# Patient Record
Sex: MALE | Race: BLACK OR AFRICAN AMERICAN | Employment: FULL TIME | URBAN - METROPOLITAN AREA
[De-identification: names, ages, dates, MRNs, and addresses within clinical notes are randomized per-mention and may not be internally consistent; named-entity substitution may affect disease eponyms.]

---

## 2017-04-03 ENCOUNTER — TELEPHONE (OUTPATIENT)
Dept: INTERNAL MEDICINE CLINIC | Age: 57
End: 2017-04-03

## 2017-04-03 ENCOUNTER — OFFICE VISIT (OUTPATIENT)
Dept: INTERNAL MEDICINE CLINIC | Age: 57
End: 2017-04-03

## 2017-04-03 VITALS
SYSTOLIC BLOOD PRESSURE: 139 MMHG | HEART RATE: 84 BPM | HEIGHT: 70 IN | DIASTOLIC BLOOD PRESSURE: 85 MMHG | RESPIRATION RATE: 18 BRPM | BODY MASS INDEX: 32.73 KG/M2 | WEIGHT: 228.6 LBS | TEMPERATURE: 98.2 F | OXYGEN SATURATION: 96 %

## 2017-04-03 DIAGNOSIS — G89.29 CHRONIC HEEL PAIN, RIGHT: ICD-10-CM

## 2017-04-03 DIAGNOSIS — R05.9 COUGH: Primary | ICD-10-CM

## 2017-04-03 DIAGNOSIS — M79.671 CHRONIC HEEL PAIN, RIGHT: ICD-10-CM

## 2017-04-03 RX ORDER — AZITHROMYCIN 250 MG/1
TABLET, FILM COATED ORAL
Qty: 6 TAB | Refills: 0 | Status: SHIPPED | OUTPATIENT
Start: 2017-04-03 | End: 2017-04-08

## 2017-04-03 NOTE — TELEPHONE ENCOUNTER
----- Message from Rivera Crawford MD sent at 4/3/2017  1:50 PM EDT -----  Please le him know his CXR was fine

## 2017-04-03 NOTE — MR AVS SNAPSHOT
Visit Information Date & Time Provider Department Dept. Phone Encounter #  
 4/3/2017  7:45 AM Johana Garcia [facility] [phone] [id number] Follow-up Instructions Return in about 3 months (around 7/3/2017), or if symptoms worsen or fail to improve. Upcoming Health Maintenance Date Due  
 EYE EXAM RETINAL OR DILATED Q1 2/7/1972 Pneumococcal 19-64 Highest Risk (1 of 3 - PCV13) 2/7/1981 DTaP/Tdap/Td series (1 - Tdap) 2/7/1983 MICROALBUMIN Q1 4/3/2017* FOBT Q 1 YEAR AGE 50-75 1/1/2018* HEMOGLOBIN A1C Q6M 6/16/2017 FOOT EXAM Q1 12/16/2017 LIPID PANEL Q1 12/16/2017 *Topic was postponed. The date shown is not the original due date. Allergies as of 4/3/2017  Review Complete On: 4/3/2017 By: Johana Garcia MD  
  
 Severity Noted Reaction Type Reactions Pcn [Penicillins]  03/20/2012    Rash Current Immunizations  Reviewed on 10/27/2014 Name Date Influenza Vaccine 10/7/2013  8:25 AM  
 Influenza Vaccine (Quad) PF 12/16/2016, 11/3/2015 Influenza Vaccine PF 10/27/2014  9:00 AM  
 Influenza Vaccine Split 11/9/2012 Not reviewed this visit You Were Diagnosed With   
  
 Codes Comments Cough    -  Primary ICD-10-CM: J07 ICD-9-CM: 988. 2 Chronic heel pain, right     ICD-10-CM: M79.671, G89.29 ICD-9-CM: 729.5, 338.29 Vitals BP Pulse Temp Resp Height(growth percentile) Weight(growth percentile) 139/85 84 98.2 °F (36.8 °C) (Oral) 18 5' 10\" (1.778 m) 228 lb 9.6 oz (103.7 kg) SpO2 BMI Smoking Status 96% 32.8 kg/m2 Current Some Day Smoker Vitals History BMI and BSA Data Body Mass Index Body Surface Area  
 32.8 kg/m 2 2.26 m 2 Preferred Pharmacy Pharmacy Name Phone [id number] [address], [address] [place] [phone] Your Updated Medication List  
  
   
This list is accurate as of: 4/3/17  8:11 AM.  Always use your most recent med list.  
  
  
  
  
 azithromycin 250 mg tablet Commonly known as:  Ann Marie Letters Take 2 tablets today, then take 1 tablet daily  
  
 losartan-hydroCHLOROthiazide 100-12.5 mg per tablet Commonly known as:  HYZAAR  
TAKE 1 TABLET BY MOUTH DAILY  
  
 ONE-A-DAY 50 PLUS PO Take  by mouth. Prescriptions Sent to Pharmacy Refills  
 azithromycin (ZITHROMAX) 250 mg tablet 0 Sig: Take 2 tablets today, then take 1 tablet daily Class: Normal  
 Pharmacy: [address] [facility] [place], [address] [place] [address], [address]  #: [phone] Follow-up Instructions Return in about 3 months (around 7/3/2017), or if symptoms worsen or fail to improve. To-Do List   
 Around 04/03/2017 Imaging:  XR CHEST PA LAT Patient Instructions Plantar Fasciitis: Care Instructions Your Care Instructions Plantar fasciitis is pain and inflammation of the plantar fascia, the tissue at the bottom of your foot that connects the heel bone to the toes. The plantar fascia also supports the arch. If you strain the plantar fascia, it can develop small tears and cause heel pain when you stand or walk. Plantar fasciitis can be caused by running or other sports. It also may occur in people who are overweight or who have high arches or flat feet. You may get plantar fasciitis if you walk or stand for long periods, or have a tight Achilles tendon or calf muscles. You can improve your foot pain with rest and other care at home. It might take a few weeks to a few months for your foot to heal completely. Follow-up care is a key part of your treatment and safety. Be sure to make and go to all appointments, and call your doctor if you are having problems. It's also a good idea to know your test results and keep a list of the medicines you take. How can you care for yourself at home? · Rest your feet often.  Reduce your activity to a level that lets you avoid pain. If possible, do not run or walk on hard surfaces. · Take pain medicines exactly as directed. ¨ If the doctor gave you a prescription medicine for pain, take it as prescribed. ¨ If you are not taking a prescription pain medicine, take an over-the-counter anti-inflammatory medicine for pain and swelling, such as ibuprofen (Advil, Motrin) or naproxen (Aleve). Read and follow all instructions on the label. · Use ice massage to help with pain and swelling. You can use an ice cube or an ice cup several times a day. To make an ice cup, fill a paper cup with water and freeze it. Cut off the top of the cup until a half-inch of ice shows. Hold onto the remaining paper to use the cup. Rub the ice in small circles over the area for 5 to 7 minutes. · Contrast baths, which alternate hot and cold water, can also help reduce swelling. But because heat alone may make pain and swelling worse, end a contrast bath with a soak in cold water. · Wear a night splint if your doctor suggests it. A night splint holds your foot with the toes pointed up and the foot and ankle at a 90-degree angle. This position gives the bottom of your foot a constant, gentle stretch. · Do simple exercises such as calf stretches and towel stretches 2 to 3 times each day, especially when you first get up in the morning. These can help the plantar fascia become more flexible. They also make the muscles that support your arch stronger. Hold these stretches for 15 to 30 seconds per stretch. Repeat 2 to 4 times. ¨ Stand about 1 foot from a wall. Place the palms of both hands against the wall at chest level. Lean forward against the wall, keeping one leg with the knee straight and heel on the ground while bending the knee of the other leg. ¨ Sit down on the floor or a mat with your feet stretched in front of you. Roll up a towel lengthwise, and loop it over the ball of your foot.  Holding the towel at both ends, gently pull the towel toward you to stretch your foot. · Wear shoes with good arch support. Athletic shoes or shoes with a well-cushioned sole are good choices. · Try heel cups or shoe inserts (orthotics) to help cushion your heel. You can buy these at many shoe stores. · Put on your shoes as soon as you get out of bed. Going barefoot or wearing slippers may make your pain worse. · Reach and stay at a good weight for your height. This puts less strain on your feet. When should you call for help? Call your doctor now or seek immediate medical care if: 
· You have heel pain with fever, redness, or warmth in your heel. · You cannot put weight on the sore foot. Watch closely for changes in your health, and be sure to contact your doctor if: 
· You have numbness or tingling in your heel. · Your heel pain lasts more than 2 weeks. Where can you learn more? Go to http://stephanieEcochlor.info/. Elisabeth Metz in the search box to learn more about \"Plantar Fasciitis: Care Instructions. \" Current as of: May 23, 2016 Content Version: 11.2 © 8146-6410 [employer]. Care instructions adapted under license by [employer] (which disclaims liability or warranty for this information). If you have questions about a medical condition or this instruction, always ask your healthcare professional. [address] any warranty or liability for your use of this information. Plantar Fasciitis: Exercises Your Care Instructions Here are some examples of typical rehabilitation exercises for your condition. Start each exercise slowly. Ease off the exercise if you start to have pain. Your doctor or physical therapist will tell you when you can start these exercises and which ones will work best for you. How to do the exercises Note: Each exercise should create a pulling feeling but should not cause pain. Towel stretch 1. Sit with your legs extended and knees straight. 2. Place a towel around your foot just under the toes. 3. Hold each end of the towel in each hand, with your hands above your knees. 4. Pull back with the towel so that your foot stretches toward you. 5. Hold the position for at least 15 to 30 seconds. 6. Repeat 2 to 4 times a session, up to 5 sessions a day. Calf stretch Note: This exercise stretches the muscles at the back of the lower leg (the calf) and the Achilles tendon. Do this exercise 3 or 4 times a day, 5 days a week. 1. Stand facing a wall with your hands on the wall at about eye level. Put the leg you want to stretch about a step behind your other leg. 2. Keeping your back heel on the floor, bend your front knee until you feel a stretch in the back leg. 3. Hold the stretch for 15 to 30 seconds. Repeat 2 to 4 times. Plantar fascia and calf stretch Note: Stretching the plantar fascia and calf muscles can increase flexibility and decrease heel pain. You can do this exercise several times each day and before and after activity. 1. Stand on a step as shown above. Be sure to hold on to the banister. 2. Slowly let your heels down over the edge of the step as you relax your calf muscles. You should feel a gentle stretch across the bottom of your foot and up the back of your leg to your knee. 3. Hold the stretch about 15 to 30 seconds, and then tighten your calf muscle a little to bring your heel back up to the level of the step. Repeat 2 to 4 times. Towel curls 1. While sitting, place your foot on a towel on the floor and scrunch the towel toward you with your toes. 2. Then, also using your toes, push the towel away from you. Note: Make this exercise more challenging by placing a weighted object, such as a soup can, on the other end of the towel. [place] pickups 1. Put marbles on the floor next to a cup. 
2. Using your toes, try to lift the marbles up from the floor and put them in the cup. Follow-up care is a key part of your treatment and safety. Be sure to make and go to all appointments, and call your doctor if you are having problems. It's also a good idea to know your test results and keep a list of the medicines you take. Where can you learn more? Go to http://stephanie-leilani.info/. Frederick Flanagan in the search box to learn more about \"Plantar Fasciitis: Exercises. \" Current as of: May 23, 2016 Content Version: 11.2 © 5954-6824 [employer]. Care instructions adapted under license by [employer] (which disclaims liability or warranty for this information). If you have questions about a medical condition or this instruction, always ask your healthcare professional. [address] any warranty or liability for your use of this information. Introducing [facility]! Dear Annie Merlos: Thank you for requesting a [employer] account. Our records indicate that you already have an active [employer] account. You can access your account anytime at [email or web address] Did you know that you can access your hospital and ER discharge instructions at any time in [employer]? You can also review all of your test results from your hospital stay or ER visit. Additional Information If you have questions, please visit the Frequently Asked Questions section of the [employer] website at [email or web address] Remember, [employer] is NOT to be used for urgent needs. For medical emergencies, dial 911. Now available from your iPhone and Android! Please provide this summary of care documentation to your next provider. Your primary care clinician is listed as Anthony Gonzales. If you have any questions after today's visit, please call [phone].

## 2017-04-03 NOTE — PATIENT INSTRUCTIONS
Plantar Fasciitis: Care Instructions  Your Care Instructions    Plantar fasciitis is pain and inflammation of the plantar fascia, the tissue at the bottom of your foot that connects the heel bone to the toes. The plantar fascia also supports the arch. If you strain the plantar fascia, it can develop small tears and cause heel pain when you stand or walk. Plantar fasciitis can be caused by running or other sports. It also may occur in people who are overweight or who have high arches or flat feet. You may get plantar fasciitis if you walk or stand for long periods, or have a tight Achilles tendon or calf muscles. You can improve your foot pain with rest and other care at home. It might take a few weeks to a few months for your foot to heal completely. Follow-up care is a key part of your treatment and safety. Be sure to make and go to all appointments, and call your doctor if you are having problems. It's also a good idea to know your test results and keep a list of the medicines you take. How can you care for yourself at home? · Rest your feet often. Reduce your activity to a level that lets you avoid pain. If possible, do not run or walk on hard surfaces. · Take pain medicines exactly as directed. ¨ If the doctor gave you a prescription medicine for pain, take it as prescribed. ¨ If you are not taking a prescription pain medicine, take an over-the-counter anti-inflammatory medicine for pain and swelling, such as ibuprofen (Advil, Motrin) or naproxen (Aleve). Read and follow all instructions on the label. · Use ice massage to help with pain and swelling. You can use an ice cube or an ice cup several times a day. To make an ice cup, fill a paper cup with water and freeze it. Cut off the top of the cup until a half-inch of ice shows. Hold onto the remaining paper to use the cup. Rub the ice in small circles over the area for 5 to 7 minutes.   · Contrast baths, which alternate hot and cold water, can also help reduce swelling. But because heat alone may make pain and swelling worse, end a contrast bath with a soak in cold water. · Wear a night splint if your doctor suggests it. A night splint holds your foot with the toes pointed up and the foot and ankle at a 90-degree angle. This position gives the bottom of your foot a constant, gentle stretch. · Do simple exercises such as calf stretches and towel stretches 2 to 3 times each day, especially when you first get up in the morning. These can help the plantar fascia become more flexible. They also make the muscles that support your arch stronger. Hold these stretches for 15 to 30 seconds per stretch. Repeat 2 to 4 times. ¨ Stand about 1 foot from a wall. Place the palms of both hands against the wall at chest level. Lean forward against the wall, keeping one leg with the knee straight and heel on the ground while bending the knee of the other leg. ¨ Sit down on the floor or a mat with your feet stretched in front of you. Roll up a towel lengthwise, and loop it over the ball of your foot. Holding the towel at both ends, gently pull the towel toward you to stretch your foot. · Wear shoes with good arch support. Athletic shoes or shoes with a well-cushioned sole are good choices. · Try heel cups or shoe inserts (orthotics) to help cushion your heel. You can buy these at many shoe stores. · Put on your shoes as soon as you get out of bed. Going barefoot or wearing slippers may make your pain worse. · Reach and stay at a good weight for your height. This puts less strain on your feet. When should you call for help? Call your doctor now or seek immediate medical care if:  · You have heel pain with fever, redness, or warmth in your heel. · You cannot put weight on the sore foot. Watch closely for changes in your health, and be sure to contact your doctor if:  · You have numbness or tingling in your heel. · Your heel pain lasts more than 2 weeks.   Where can you learn more? Go to http://stephanie-leilani.info/. Tano Mendoza in the search box to learn more about \"Plantar Fasciitis: Care Instructions. \"  Current as of: May 23, 2016  Content Version: 11.2  © 1937-3158 [employer]. Care instructions adapted under license by [employer] (which disclaims liability or warranty for this information). If you have questions about a medical condition or this instruction, always ask your healthcare professional. [address] any warranty or liability for your use of this information. Plantar Fasciitis: Exercises  Your Care Instructions  Here are some examples of typical rehabilitation exercises for your condition. Start each exercise slowly. Ease off the exercise if you start to have pain. Your doctor or physical therapist will tell you when you can start these exercises and which ones will work best for you. How to do the exercises  Note: Each exercise should create a pulling feeling but should not cause pain. Towel stretch    1. Sit with your legs extended and knees straight. 2. Place a towel around your foot just under the toes. 3. Hold each end of the towel in each hand, with your hands above your knees. 4. Pull back with the towel so that your foot stretches toward you. 5. Hold the position for at least 15 to 30 seconds. 6. Repeat 2 to 4 times a session, up to 5 sessions a day. Calf stretch    Note: This exercise stretches the muscles at the back of the lower leg (the calf) and the Achilles tendon. Do this exercise 3 or 4 times a day, 5 days a week. 1. Stand facing a wall with your hands on the wall at about eye level. Put the leg you want to stretch about a step behind your other leg. 2. Keeping your back heel on the floor, bend your front knee until you feel a stretch in the back leg. 3. Hold the stretch for 15 to 30 seconds. Repeat 2 to 4 times.   Plantar fascia and calf stretch    Note: Stretching the plantar fascia and calf muscles can increase flexibility and decrease heel pain. You can do this exercise several times each day and before and after activity. 1. Stand on a step as shown above. Be sure to hold on to the banister. 2. Slowly let your heels down over the edge of the step as you relax your calf muscles. You should feel a gentle stretch across the bottom of your foot and up the back of your leg to your knee. 3. Hold the stretch about 15 to 30 seconds, and then tighten your calf muscle a little to bring your heel back up to the level of the step. Repeat 2 to 4 times. Towel curls    1. While sitting, place your foot on a towel on the floor and scrunch the towel toward you with your toes. 2. Then, also using your toes, push the towel away from you. Note: Make this exercise more challenging by placing a weighted object, such as a soup can, on the other end of the towel. [place] pickups    1. Put marbles on the floor next to a cup.  2. Using your toes, try to lift the marbles up from the floor and put them in the cup. Follow-up care is a key part of your treatment and safety. Be sure to make and go to all appointments, and call your doctor if you are having problems. It's also a good idea to know your test results and keep a list of the medicines you take. Where can you learn more? Go to http://stephanie-leilani.info/. Fabiano Backers in the search box to learn more about \"Plantar Fasciitis: Exercises. \"  Current as of: May 23, 2016  Content Version: 11.2  © 6445-0601 [employer]. Care instructions adapted under license by [employer] (which disclaims liability or warranty for this information). If you have questions about a medical condition or this instruction, always ask your healthcare professional. [address] any warranty or liability for your use of this information.
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
## 2017-04-03 NOTE — TELEPHONE ENCOUNTER
Attempted to contact pt at  number, no answer. Lvm for pt that everything looked clear and normal. Advised to contact office at [phone] if sxs worsen or do not improve.      Be advised

## 2017-04-03 NOTE — PROGRESS NOTES
Chief Complaint   Patient presents with    Cough     Pt states that he caught a cold approx 5-6 weeks ago, and has not been able to kick the cough       Pt preferred language for health care discussion is english. Is someone accompanying this pt? no    Is the patient using any DME equipment during OV? no    Depression Screening completed. Yes    Learning Assessment completed. Yes    Abuse Screening completed. Yes    Health Maintenance reviewed and discussed per provider. Yes    Pt is due for Eye exam, Pneumo-13 or Peumo-23, Tdap. Please order/place referral if appropriate. Advance Directive:  1. Do you have an advance directive in place? Patient Reply:no    2. If not, would you like material regarding how to put one in place? Patient Reply: No    Coordination of Care:  1. Have you been to the ER, urgent care clinic since your last visit? Hospitalized since your last visit? no    2. Have you seen or consulted any other health care providers outside of the [address] [place] [address] since your last visit? Include any pap smears or colon screening.  no

## 2017-07-05 ENCOUNTER — OFFICE VISIT (OUTPATIENT)
Dept: INTERNAL MEDICINE CLINIC | Age: 57
End: 2017-07-05

## 2017-07-05 ENCOUNTER — HOSPITAL ENCOUNTER (OUTPATIENT)
Dept: LAB | Age: 57
Discharge: HOME | End: 2017-07-05
Payer: COMMERCIAL

## 2017-07-05 VITALS
HEART RATE: 69 BPM | DIASTOLIC BLOOD PRESSURE: 87 MMHG | HEIGHT: 70 IN | RESPIRATION RATE: 18 BRPM | OXYGEN SATURATION: 97 % | WEIGHT: 223 LBS | TEMPERATURE: 97.1 F | SYSTOLIC BLOOD PRESSURE: 121 MMHG | BODY MASS INDEX: 31.92 KG/M2

## 2017-07-05 DIAGNOSIS — Z23 NEED FOR TDAP VACCINATION: ICD-10-CM

## 2017-07-05 DIAGNOSIS — R73.03 PREDIABETES: ICD-10-CM

## 2017-07-05 DIAGNOSIS — E78.5 DYSLIPIDEMIA: ICD-10-CM

## 2017-07-05 DIAGNOSIS — I10 BENIGN HYPERTENSION WITHOUT CHF: ICD-10-CM

## 2017-07-05 DIAGNOSIS — I10 BENIGN HYPERTENSION WITHOUT CHF: Primary | ICD-10-CM

## 2017-07-05 LAB
ALBUMIN SERPL BCP-MCNC: 4.1 G/DL (ref 3.4–5)
ALBUMIN/GLOB SERPL: 1.5 {RATIO} (ref 0.8–1.7)
ALP SERPL-CCNC: 63 U/L (ref 45–117)
ALT SERPL-CCNC: 37 U/L (ref 16–61)
ANION GAP BLD CALC-SCNC: 9 MMOL/L (ref 3–18)
APPEARANCE UR: CLEAR
AST SERPL W P-5'-P-CCNC: 48 U/L (ref 15–37)
BILIRUB SERPL-MCNC: 0.7 MG/DL (ref 0.2–1)
BILIRUB UR QL: NEGATIVE
BUN SERPL-MCNC: 19 MG/DL (ref 7–18)
BUN/CREAT SERPL: 20 (ref 12–20)
CALCIUM SERPL-MCNC: 9.3 MG/DL (ref 8.5–10.1)
CHLORIDE SERPL-SCNC: 104 MMOL/L (ref 100–108)
CHOLEST SERPL-MCNC: 132 MG/DL
CO2 SERPL-SCNC: 26 MMOL/L (ref 21–32)
COLOR UR: YELLOW
CREAT SERPL-MCNC: 0.93 MG/DL (ref 0.6–1.3)
ERYTHROCYTE [DISTWIDTH] IN BLOOD BY AUTOMATED COUNT: 14.2 % (ref 11.6–14.5)
GLOBULIN SER CALC-MCNC: 2.7 G/DL (ref 2–4)
GLUCOSE SERPL-MCNC: 103 MG/DL (ref 74–99)
GLUCOSE UR STRIP.AUTO-MCNC: NEGATIVE MG/DL
HBA1C MFR BLD: 6 % (ref 4.2–5.6)
HCT VFR BLD AUTO: 44.8 % (ref 36–48)
HDLC SERPL-MCNC: 45 MG/DL (ref 40–60)
HDLC SERPL: 2.9 {RATIO} (ref 0–5)
HGB BLD-MCNC: 14.8 G/DL (ref 13–16)
HGB UR QL STRIP: NEGATIVE
KETONES UR QL STRIP.AUTO: NEGATIVE MG/DL
LDLC SERPL CALC-MCNC: 70 MG/DL (ref 0–100)
LEUKOCYTE ESTERASE UR QL STRIP.AUTO: NEGATIVE
LIPID PROFILE,FLP: NORMAL
MCH RBC QN AUTO: 28.8 PG (ref 24–34)
MCHC RBC AUTO-ENTMCNC: 33 G/DL (ref 31–37)
MCV RBC AUTO: 87.3 FL (ref 74–97)
NITRITE UR QL STRIP.AUTO: NEGATIVE
PH UR STRIP: 7 [PH] (ref 5–8)
PLATELET # BLD AUTO: 253 K/UL (ref 135–420)
PMV BLD AUTO: 12.1 FL (ref 9.2–11.8)
POTASSIUM SERPL-SCNC: 3.9 MMOL/L (ref 3.5–5.5)
PROT SERPL-MCNC: 6.8 G/DL (ref 6.4–8.2)
PROT UR STRIP-MCNC: NEGATIVE MG/DL
RBC # BLD AUTO: 5.13 M/UL (ref 4.7–5.5)
SODIUM SERPL-SCNC: 139 MMOL/L (ref 136–145)
SP GR UR REFRACTOMETRY: 1.01 (ref 1–1.03)
TRIGL SERPL-MCNC: 85 MG/DL (ref ?–150)
UROBILINOGEN UR QL STRIP.AUTO: 0.2 EU/DL (ref 0.2–1)
VLDLC SERPL CALC-MCNC: 17 MG/DL
WBC # BLD AUTO: 5.7 K/UL (ref 4.6–13.2)

## 2017-07-05 PROCEDURE — 80061 LIPID PANEL: CPT | Performed by: INTERNAL MEDICINE

## 2017-07-05 PROCEDURE — 36415 COLL VENOUS BLD VENIPUNCTURE: CPT | Performed by: INTERNAL MEDICINE

## 2017-07-05 PROCEDURE — 85027 COMPLETE CBC AUTOMATED: CPT | Performed by: INTERNAL MEDICINE

## 2017-07-05 PROCEDURE — 83036 HEMOGLOBIN GLYCOSYLATED A1C: CPT | Performed by: INTERNAL MEDICINE

## 2017-07-05 PROCEDURE — 80053 COMPREHEN METABOLIC PANEL: CPT | Performed by: INTERNAL MEDICINE

## 2017-07-05 PROCEDURE — 81003 URINALYSIS AUTO W/O SCOPE: CPT | Performed by: INTERNAL MEDICINE

## 2017-07-05 NOTE — PROGRESS NOTES
ROOM # 2    Jeovanny Willard  presents today for   Chief Complaint   Patient presents with    Hypertension     3 month f/u    Cholesterol Problem     3 month f/u       Teetee Shultz preferred language for health care discussion is english/other. Is someone accompanying this pt? no    Is the patient using any DME equipment during OV? no    Depression Screening:  PHQ over the last two weeks 7/5/2017 4/3/2017 12/16/2016 7/2/2015 6/25/2014 2/7/2014   Little interest or pleasure in doing things Not at all Not at all Not at all Not at all Not at all Not at all   Feeling down, depressed or hopeless Not at all Not at all Not at all Not at all Not at all Not at all   Total Score PHQ 2 0 0 0 0 0 0       Learning Assessment:  Learning Assessment 3/4/2016 6/25/2014   PRIMARY LEARNER Patient Patient   HIGHEST LEVEL OF EDUCATION - PRIMARY LEARNER  GRADUATED HIGH SCHOOL OR GED -   BARRIERS PRIMARY LEARNER NONE -   CO-LEARNER CAREGIVER No -   PRIMARY LANGUAGE ENGLISH ENGLISH    NEED No -   LEARNER PREFERENCE PRIMARY DEMONSTRATION LISTENING   LEARNING SPECIAL TOPICS no -   ANSWERED BY patient patient   RELATIONSHIP SELF SELF   ASSESSMENT COMMENT none -       Abuse Screening:  No flowsheet data found. Fall Risk  Fall Risk Assessment, last 12 mths 6/25/2014   Able to walk? Yes   Fall in past 12 months? No       Health Maintenance reviewed and discussed per provider. Yes    Teetee Shultz is due for eye exam, pneumonia vax, TDAP vax, microalbumin, A1C . Please order/place referral if appropriate. Advance Directive:  1. Do you have an advance directive in place? Patient Reply: no    2. If not, would you like material regarding how to put one in place? Patient Reply: no    Coordination of Care:  1. Have you been to the ER, urgent care clinic since your last visit? Hospitalized since your last visit? no    2.  Have you seen or consulted any other health care providers outside of the [facility] [address] since your last visit? Include any pap smears or colon screening.  oncology

## 2017-07-05 NOTE — MR AVS SNAPSHOT
Visit Information Date & Time Provider Department Dept. Phone Encounter #  
 7/5/2017  8:00 AM Davey Pathak MD [address] [phone] [id number] Follow-up Instructions Return in about 6 months (around 1/5/2018) for CPE/HTN/Pre-DM with Dr. Brian Gamble. Upcoming Health Maintenance Date Due  
 EYE EXAM RETINAL OR DILATED Q1 2/7/1972 FOBT Q 1 YEAR AGE 50-75 1/1/2018* INFLUENZA AGE 9 TO ADULT 8/1/2017 FOOT EXAM Q1 12/16/2017 LIPID PANEL Q1 12/16/2017 HEMOGLOBIN A1C Q6M 1/5/2018 Pneumococcal 19-64 Highest Risk (2 of 3 - PCV13) 7/5/2018 MICROALBUMIN Q1 7/5/2018 DTaP/Tdap/Td series (2 - Td) 7/5/2027 *Topic was postponed. The date shown is not the original due date. Allergies as of 7/5/2017  Review Complete On: 7/5/2017 By: Davey Pathak MD  
  
 Severity Noted Reaction Type Reactions Pcn [Penicillins]  03/20/2012    Rash Current Immunizations  Reviewed on 10/27/2014 Name Date Influenza Vaccine 10/7/2013  8:25 AM  
 Influenza Vaccine (Quad) PF 12/16/2016, 11/3/2015 Influenza Vaccine PF 10/27/2014  9:00 AM  
 Influenza Vaccine Split 11/9/2012 Tdap  Incomplete Not reviewed this visit You Were Diagnosed With   
  
 Codes Comments Benign hypertension without CHF    -  Primary ICD-10-CM: I10 
ICD-9-CM: 401.1 Prediabetes     ICD-10-CM: R73.03 
ICD-9-CM: 790.29 Dyslipidemia     ICD-10-CM: E78.5 ICD-9-CM: 272.4 Need for Tdap vaccination     ICD-10-CM: R03 ICD-9-CM: V06.1 Vitals BP Pulse Temp Resp Height(growth percentile) Weight(growth percentile) 121/87 (BP 1 Location: Left arm, BP Patient Position: Sitting) 69 97.1 °F (36.2 °C) (Oral) 18 5' 10\" (1.778 m) 223 lb (101.2 kg) SpO2 BMI Smoking Status 97% 32 kg/m2 Current Some Day Smoker Vitals History BMI and BSA Data Body Mass Index Body Surface Area 32 kg/m 2 2.24 m 2 Preferred Pharmacy Pharmacy Name Phone [facility][id number], [phone] Your Updated Medication List  
  
   
This list is accurate as of: 7/5/17  8:28 AM.  Always use your most recent med list.  
  
  
  
  
 losartan-hydroCHLOROthiazide 100-12.5 mg per tablet Commonly known as:  HYZAAR  
TAKE 1 TABLET BY MOUTH DAILY  
  
 ONE-A-DAY 50 PLUS PO Take  by mouth. We Performed the Following TETANUS, DIPHTHERIA TOXOIDS AND ACELLULAR PERTUSSIS VACCINE (TDAP), IN INDIVIDS. >=7, IM [id number] CPT(R)] Follow-up Instructions Return in about 6 months (around 1/5/2018) for CPE/HTN/Pre-DM with Dr. Brian Gamble. To-Do List   
 07/05/2017 Lab:  CBC W/O DIFF   
  
 07/05/2017 Lab:  HEMOGLOBIN A1C W/O EAG   
  
 07/05/2017 Lab:  LIPID PANEL   
  
 07/05/2017 Lab:  METABOLIC PANEL, COMPREHENSIVE   
  
 07/05/2017 Lab:  URINALYSIS W/ RFLX MICROSCOPIC Patient Instructions 1) follow-up in 6 months or sooner if worsening symptoms. 2) Dr. Irene To fax number: [phone] (p) [phone] Introducing hospitals & HEALTH SERVICES! Dear Skyla Infante: Thank you for requesting a [facility] account. Our records indicate that you already have an active [facility] account. You can access your account anytime at [email or web address] Did you know that you can access your hospital and ER discharge instructions at any time in [facility]? You can also review all of your test results from your hospital stay or ER visit. Additional Information If you have questions, please visit the Frequently Asked Questions section of the [facility] website at [email or web address] Remember, [facility] is NOT to be used for urgent needs. For medical emergencies, dial 911. Now available from your iPhone and Android! Please provide this summary of care documentation to your next provider. Your primary care clinician is listed as Anthony Gonzales. If you have any questions after today's visit, please call [phone].

## 2017-07-05 NOTE — PATIENT INSTRUCTIONS
1) follow-up in 6 months or sooner if worsening symptoms.        2) Dr. Chirag Barry fax number: [phone] (p) [phone]
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
## 2017-07-09 NOTE — PROGRESS NOTES
Please let pt know that labs were normal except:    1) HgA1c is 6.0. He is still preDM and not DM. Continue to work on diet and exercise. 2) LFT little up. Is he taking tylenol, herbals, IVDU, ETOH? If so needs to stop. Will recheck in 2 weeks. 3) glucose up at 103. Work on diet and exercise. 4) kidney function up a little. We will monitor. Keep hydrated with water. Probably up because fasting. 5) does he have f/u with his PCP in 3 months for HTN and predm?

## 2017-07-12 ENCOUNTER — TELEPHONE (OUTPATIENT)
Dept: INTERNAL MEDICINE CLINIC | Age: 57
End: 2017-07-12

## 2017-07-12 NOTE — TELEPHONE ENCOUNTER
----- Message from Ranjana Cox MD sent at 7/9/2017  4:57 PM EDT -----  Please let pt know that labs were normal except:    1) HgA1c is 6.0. He is still preDM and not DM. Continue to work on diet and exercise. 2) LFT little up. Is he taking tylenol, herbals, IVDU, ETOH? If so needs to stop. Will recheck in 2 weeks. 3) glucose up at 103. Work on diet and exercise. 4) kidney function up a little. We will monitor. Keep hydrated with water. Probably up because fasting. 5) does he have f/u with his PCP in 3 months for HTN and predm?

## 2017-07-13 NOTE — TELEPHONE ENCOUNTER
2 pt. Identifiers confirmed. Pt. Notified of below. Pt. States he has been taking Black seed oil, that he started on his own for BP control, and ibuprofen. Pt. Told to hold the black seed oil for now. He denies taking anything else mentioned below. He does have an aptmt to f/u c PCP. No other questions/cocnerns at this time.

## 2017-12-22 RX ORDER — LOSARTAN POTASSIUM AND HYDROCHLOROTHIAZIDE 12.5; 1 MG/1; MG/1
TABLET ORAL
Qty: 90 TAB | Refills: 5 | Status: SHIPPED | OUTPATIENT
Start: 2017-12-22 | End: 2017-12-22 | Stop reason: SDUPTHER

## 2017-12-22 RX ORDER — LOSARTAN POTASSIUM AND HYDROCHLOROTHIAZIDE 12.5; 1 MG/1; MG/1
TABLET ORAL
Qty: 90 TAB | Refills: 5 | Status: SHIPPED | OUTPATIENT
Start: 2017-12-22 | End: 2019-01-10

## 2017-12-22 NOTE — TELEPHONE ENCOUNTER
Requested Prescriptions     Pending Prescriptions Disp Refills    losartan-hydroCHLOROthiazide (HYZAAR) 100-12.5 mg per tablet 90 Tab 5     Sig: TAKE 1 TABLET BY MOUTH DAILY

## 2018-01-26 ENCOUNTER — OFFICE VISIT (OUTPATIENT)
Dept: INTERNAL MEDICINE CLINIC | Age: 58
End: 2018-01-26

## 2018-01-26 ENCOUNTER — HOSPITAL ENCOUNTER (OUTPATIENT)
Dept: LAB | Age: 58
Discharge: HOME | End: 2018-01-26
Payer: COMMERCIAL

## 2018-01-26 VITALS
RESPIRATION RATE: 16 BRPM | BODY MASS INDEX: 32.5 KG/M2 | SYSTOLIC BLOOD PRESSURE: 135 MMHG | DIASTOLIC BLOOD PRESSURE: 87 MMHG | HEART RATE: 91 BPM | OXYGEN SATURATION: 98 % | WEIGHT: 227 LBS | TEMPERATURE: 98 F | HEIGHT: 70 IN

## 2018-01-26 DIAGNOSIS — R05.9 COUGH: ICD-10-CM

## 2018-01-26 DIAGNOSIS — Z11.59 NEED FOR HEPATITIS B SCREENING TEST: ICD-10-CM

## 2018-01-26 DIAGNOSIS — I10 ESSENTIAL HYPERTENSION: ICD-10-CM

## 2018-01-26 DIAGNOSIS — E11.9 TYPE 2 DIABETES MELLITUS WITHOUT COMPLICATION, WITHOUT LONG-TERM CURRENT USE OF INSULIN (HCC): ICD-10-CM

## 2018-01-26 DIAGNOSIS — Z12.5 SCREENING FOR PROSTATE CANCER: ICD-10-CM

## 2018-01-26 DIAGNOSIS — I10 ESSENTIAL HYPERTENSION: Primary | ICD-10-CM

## 2018-01-26 LAB
ALBUMIN SERPL-MCNC: 4 G/DL (ref 3.4–5)
ALBUMIN/GLOB SERPL: 1.3 {RATIO} (ref 0.8–1.7)
ALP SERPL-CCNC: 64 U/L (ref 45–117)
ALT SERPL-CCNC: 22 U/L (ref 16–61)
ANION GAP SERPL CALC-SCNC: 6 MMOL/L (ref 3–18)
AST SERPL-CCNC: 25 U/L (ref 15–37)
BILIRUB SERPL-MCNC: 0.6 MG/DL (ref 0.2–1)
BUN SERPL-MCNC: 14 MG/DL (ref 7–18)
BUN/CREAT SERPL: 13 (ref 12–20)
CALCIUM SERPL-MCNC: 9.2 MG/DL (ref 8.5–10.1)
CHLORIDE SERPL-SCNC: 102 MMOL/L (ref 100–108)
CHOLEST SERPL-MCNC: 164 MG/DL
CO2 SERPL-SCNC: 29 MMOL/L (ref 21–32)
CREAT SERPL-MCNC: 1.09 MG/DL (ref 0.6–1.3)
EST. AVERAGE GLUCOSE BLD GHB EST-MCNC: 131 MG/DL
GLOBULIN SER CALC-MCNC: 3 G/DL (ref 2–4)
GLUCOSE SERPL-MCNC: 90 MG/DL (ref 74–99)
HBA1C MFR BLD: 6.2 % (ref 4.2–5.6)
HDLC SERPL-MCNC: 45 MG/DL (ref 40–60)
HDLC SERPL: 3.6 {RATIO} (ref 0–5)
LDLC SERPL CALC-MCNC: 93.8 MG/DL (ref 0–100)
LIPID PROFILE,FLP: NORMAL
POTASSIUM SERPL-SCNC: 4.3 MMOL/L (ref 3.5–5.5)
PROT SERPL-MCNC: 7 G/DL (ref 6.4–8.2)
PSA SERPL-MCNC: 0.4 NG/ML (ref 0–4)
SODIUM SERPL-SCNC: 137 MMOL/L (ref 136–145)
TRIGL SERPL-MCNC: 126 MG/DL (ref ?–150)
VLDLC SERPL CALC-MCNC: 25.2 MG/DL

## 2018-01-26 PROCEDURE — 86706 HEP B SURFACE ANTIBODY: CPT | Performed by: INTERNAL MEDICINE

## 2018-01-26 PROCEDURE — 86704 HEP B CORE ANTIBODY TOTAL: CPT | Performed by: INTERNAL MEDICINE

## 2018-01-26 PROCEDURE — 86707 HEPATITIS BE ANTIBODY: CPT | Performed by: INTERNAL MEDICINE

## 2018-01-26 PROCEDURE — 80053 COMPREHEN METABOLIC PANEL: CPT | Performed by: INTERNAL MEDICINE

## 2018-01-26 PROCEDURE — 87340 HEPATITIS B SURFACE AG IA: CPT | Performed by: INTERNAL MEDICINE

## 2018-01-26 PROCEDURE — 80061 LIPID PANEL: CPT | Performed by: INTERNAL MEDICINE

## 2018-01-26 PROCEDURE — 84153 ASSAY OF PSA TOTAL: CPT | Performed by: INTERNAL MEDICINE

## 2018-01-26 PROCEDURE — 83036 HEMOGLOBIN GLYCOSYLATED A1C: CPT | Performed by: INTERNAL MEDICINE

## 2018-01-26 RX ORDER — ENTECAVIR 0.5 MG/1
TABLET, FILM COATED ORAL
COMMUNITY
Start: 2018-01-12 | End: 2019-07-16

## 2018-01-26 RX ORDER — FLUTICASONE PROPIONATE 50 MCG
2 SPRAY, SUSPENSION (ML) NASAL DAILY
Qty: 1 BOTTLE | Refills: 3 | Status: SHIPPED | OUTPATIENT
Start: 2018-01-26 | End: 2020-01-15 | Stop reason: SDUPTHER
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
## 2018-01-26 NOTE — PROGRESS NOTES
ROOM [id number] [facility] [address] presents today for   Chief Complaint   Patient presents with    Hypertension    Hepatitis B     2nd opinion       [id number] [facility] preferred language for health care discussion is english/other. Is someone accompanying this pt? no    Is the patient using any DME equipment during OV? no    Depression Screening:  PHQ over the last two weeks 7/5/2017 4/3/2017 12/16/2016 7/2/2015 6/25/2014 2/7/2014   Little interest or pleasure in doing things Not at all Not at all Not at all Not at all Not at all Not at all   Feeling down, depressed or hopeless Not at all Not at all Not at all Not at all Not at all Not at all   Total Score PHQ 2 0 0 0 0 0 0       Learning Assessment:  Learning Assessment 3/4/2016 6/25/2014   PRIMARY LEARNER Patient Patient   HIGHEST LEVEL OF EDUCATION - PRIMARY LEARNER  GRADUATED HIGH SCHOOL OR GED -   BARRIERS PRIMARY LEARNER NONE -   CO-LEARNER CAREGIVER No -   PRIMARY LANGUAGE ENGLISH ENGLISH    NEED No -   LEARNER PREFERENCE PRIMARY DEMONSTRATION LISTENING   LEARNING SPECIAL TOPICS no -   ANSWERED BY patient patient   RELATIONSHIP SELF SELF   ASSESSMENT COMMENT none -     Health Maintenance reviewed and discussed per provider. Yes    [id number] [facility] is due for eye exam, fobt influenza (stated he got it doesn't know when), foot a1c. Please order/place referral if appropriate. Advance Directive:  1. Do you have an advance directive in place? Patient Reply: yes    2. Per patient no changes to their ACP contact [address] Coordination of Care:  1. Have you been to the ER, urgent care clinic since your last visit? Hospitalized since your last visit? no    2. Have you seen or consulted any other health care providers outside of the [address] [place] [address] since your last visit? Include any pap smears or colon screening.  Yes, oncology

## 2018-01-26 NOTE — MR AVS SNAPSHOT
[address] [place] [address] 
 
 
 [address] [facility] [address] [address] [phone] Patient: Cj Salguero MRN: [id number]:[id number] Visit Information Date & Time Provider Department Dept. Phone Encounter #  
 1/26/2018  9:15 [facility] [phone] [id number] Follow-up Instructions Return in about 6 months (around 7/26/2018), or if symptoms worsen or fail to improve. Upcoming Health Maintenance Date Due  
 EYE EXAM RETINAL OR DILATED Q1 2/7/1972 FOBT Q 1 YEAR AGE 50-75 2/7/2015 Influenza Age 5 to Adult 8/1/2017 FOOT EXAM Q1 12/16/2017 HEMOGLOBIN A1C Q6M 1/5/2018 Pneumococcal 19-64 Highest Risk (2 of 3 - PCV13) 7/5/2018 MICROALBUMIN Q1 7/5/2018 LIPID PANEL Q1 7/5/2018 DTaP/Tdap/Td series (2 - Td) 7/5/2027 Allergies as of 1/26/2018  Review Complete On: 1/26/2018 By: Bobbi Hernandez MD  
  
 Severity Noted Reaction Type Reactions Pcn [Penicillins]  03/20/2012    Rash Current Immunizations  Reviewed on 7/5/2017 Name Date Influenza Vaccine 10/7/2013  8:25 AM  
 Influenza Vaccine (Quad) PF 12/16/2016, 11/3/2015 Influenza Vaccine PF 10/27/2014  9:00 AM  
 Influenza Vaccine Split 11/9/2012 Tdap 7/5/2017 Not reviewed this visit You Were Diagnosed With   
  
 Codes Comments Essential hypertension    -  Primary ICD-10-CM: I10 
ICD-9-CM: 401.9 Need for hepatitis B screening test     ICD-10-CM: Z11.59 
ICD-9-CM: V73.89 Cough     ICD-10-CM: R05 ICD-9-CM: 905. 2 Type 2 diabetes mellitus without complication, without long-term current use of insulin (HCC)     ICD-10-CM: E11.9 ICD-9-CM: 250.00 Screening for prostate cancer     ICD-10-CM: Z12.5 ICD-9-CM: V76.44 Vitals BP Pulse Temp Resp Height(growth percentile) Weight(growth percentile)  135/87 (BP 1 Location: Right arm, BP Patient Position: Sitting) 91 98 °F (36.7 °C) (Oral) 16 5' 10\" (1.778 m) 227 lb (103 kg) SpO2 BMI Smoking Status 98% 32.57 kg/m2 Current Some Day Smoker Vitals History BMI and BSA Data Body Mass Index Body Surface Area 32.57 kg/m 2 2.26 m 2 Preferred Pharmacy Pharmacy Name Phone [id number] [place], [address] [place] [address] [phone] Your Updated Medication List  
  
   
This list is accurate as of: 18 10:01 AM.  Always use your most recent med list.  
  
  
  
  
 entecavir 0.5 mg tablet Commonly known as:  BARACLUDE  
  
 fluticasone 50 mcg/actuation nasal spray Commonly known as:  Yulissa Bakari 2 Sprays by Both Nostrils route daily. losartan-hydroCHLOROthiazide 100-12.5 mg per tablet Commonly known as:  HYZAAR  
TAKE 1 TABLET BY MOUTH DAILY  
  
 ONE-A-DAY 50 PLUS PO Take  by mouth. Prescriptions Sent to Pharmacy Refills  
 fluticasone (FLONASE) 50 mcg/actuation nasal spray 3 Si Sprays by Both Nostrils route daily. Class: Normal  
 Pharmacy: [address] [place] [address]  #: [phone] Route: Both Nostrils Follow-up Instructions Return in about 6 months (around 2018), or if symptoms worsen or fail to improve. To-Do List   
 2018 Lab:  HEMOGLOBIN A1C WITH EAG   
  
 2018 Lab:  HEP B SURFACE AB   
  
 2018 Lab:  HEP B SURFACE AG   
  
 2018 Lab:  HEPATITIS B CORE AB, TOTAL   
  
 2018 Lab:  HEPATITIS BE AB   
  
 2018 Lab:  LIPID PANEL   
  
 2018 Lab:  METABOLIC PANEL, COMPREHENSIVE   
  
 2018 Lab:  PSA SCREENING (SCREENING) Patient Instructions Low Sodium Diet (2,000 Milligram): Care Instructions Your Care Instructions Too much sodium causes your body to hold on to extra water. This can raise your blood pressure and force your heart and kidneys to work harder.  In very serious cases, this could cause you to be put in the hospital. It might even be life-threatening. By limiting sodium, you will feel better and lower your risk of serious problems. The most common source of sodium is salt. People get most of the salt in their diet from canned, prepared, and packaged foods. Fast food and restaurant meals also are very high in sodium. Your doctor will probably limit your sodium to less than 2,000 milligrams (mg) a day. This limit counts all the sodium in prepared and packaged foods and any salt you add to your food. Follow-up care is a key part of your treatment and safety. Be sure to make and go to all appointments, and call your doctor if you are having problems. It's also a good idea to know your test results and keep a list of the medicines you take. How can you care for yourself at home? Read food labels · Read labels on cans and food packages. The labels tell you how much sodium is in each serving. Make sure that you look at the serving size. If you eat more than the serving size, you have eaten more sodium. · Food labels also tell you the Percent Daily Value for sodium. Choose products with low Percent Daily Values for sodium. · Be aware that sodium can come in forms other than salt, including monosodium glutamate (MSG), sodium citrate, and sodium bicarbonate (baking soda). MSG is often added to Asian food. When you eat out, you can sometimes ask for food without MSG or added salt. Buy low-sodium foods · Buy foods that are labeled \"unsalted\" (no salt added), \"sodium-free\" (less than 5 mg of sodium per serving), or \"low-sodium\" (less than 140 mg of sodium per serving). Foods labeled \"reduced-sodium\" and \"light sodium\" may still have too much sodium. Be sure to read the label to see how much sodium you are getting. · Buy fresh vegetables, or frozen vegetables without added sauces. Buy low-sodium versions of canned vegetables, soups, and other canned goods. Prepare low-sodium meals · Cut back on the amount of salt you use in cooking. This will help you adjust to the taste. Do not add salt after cooking. One teaspoon of salt has about 2,300 mg of sodium. · Take the salt shaker off the table. · Flavor your food with garlic, lemon juice, onion, vinegar, herbs, and spices. Do not use soy sauce, lite soy sauce, steak sauce, onion salt, garlic salt, celery salt, mustard, or ketchup on your food. · Use low-sodium salad dressings, sauces, and ketchup. Or make your own salad dressings and sauces without adding salt. · Use less salt (or none) when recipes call for it. You can often use half the salt a recipe calls for without losing flavor. Other foods such as rice, pasta, and grains do not need added salt. · Rinse canned vegetables, and cook them in fresh water. This removes some-but not all-of the salt. · Avoid water that is naturally high in sodium or that has been treated with water softeners, which add sodium. Call your local water company to find out the sodium content of your water supply. If you buy bottled water, read the label and choose a sodium-free brand. Avoid high-sodium foods · Avoid eating: ¨ Smoked, cured, salted, and canned meat, fish, and poultry. ¨ Ham, meléndez, hot dogs, and luncheon meats. ¨ Regular, hard, and processed cheese and regular peanut butter. ¨ Crackers with salted tops, and other salted snack foods such as pretzels, chips, and salted popcorn. ¨ Frozen prepared meals, unless labeled low-sodium. ¨ Canned and dried soups, broths, and bouillon, unless labeled sodium-free or low-sodium. ¨ Canned vegetables, unless labeled sodium-free or low-sodium. ¨ Western Noelle fries, pizza, tacos, and other fast foods. ¨ Pickles, olives, ketchup, and other condiments, especially soy sauce, unless labeled sodium-free or low-sodium. Where can you learn more? Go to http://reginald.info/. Enter V198 in the search box to learn more about \"Low Sodium Diet (2,000 Milligram): Care Instructions. \" Current as of: May 12, 2017 Content Version: 11.4 © 9123-8252 [employer]. Care instructions adapted under license by [employer] (which disclaims liability or warranty for this information). If you have questions about a medical condition or this instruction, always ask your healthcare professional. [place] [address] any warranty or liability for your use of this information. Introducing \[facility]\"" & HEALTH SERVICES! Dear Lawrence Latif: Thank you for requesting a PulseSocks account. Our records indicate that you already have an active PulseSocks account. You can access your account anytime at [email or web address] Did you know that you can access your hospital and ER discharge instructions at any time in PulseSocks? You can also review all of your test results from your hospital stay or ER visit. Additional Information If you have questions, please visit the Frequently Asked Questions section of the PulseSocks website at [email or web address] Remember, PulseSocks is NOT to be used for urgent needs. For medical emergencies, dial 911. Now available from your iPhone and Android! Please provide this summary of care documentation to your next provider. Your primary care clinician is listed as Anthony Gonzales. If you have any questions after today's visit, please call [phone].

## 2018-01-26 NOTE — PATIENT INSTRUCTIONS

## 2018-01-27 LAB — HBV CORE AB SERPL QL IA: POSITIVE

## 2018-01-28 LAB
HBV SURFACE AG SER QL: <0.1 INDEX
HBV SURFACE AG SER QL: NEGATIVE

## 2018-01-29 LAB
HBV E AB SERPL QL IA: POSITIVE
HBV SURFACE AB SER QL IA: POSITIVE
HBV SURFACE AB SERPL IA-ACNC: 260.21 MIU/ML
HEP BS AB COMMENT,HBSAC: NORMAL

## 2018-02-01 ENCOUNTER — TELEPHONE (OUTPATIENT)
Dept: INTERNAL MEDICINE CLINIC | Age: 58
End: 2018-02-01

## 2018-02-02 NOTE — TELEPHONE ENCOUNTER
Called spoke with patient advised that Dr. Halle Urena did send a letter and per her letter his labs are stable but he is positive hep b and that he needs to continue to follow up with his oncologist.  Patient verbalized understanding.

## 2018-07-10 ENCOUNTER — HOSPITAL ENCOUNTER (OUTPATIENT)
Dept: LAB | Age: 58
Discharge: HOME | End: 2018-07-10
Payer: COMMERCIAL

## 2018-07-10 ENCOUNTER — OFFICE VISIT (OUTPATIENT)
Dept: INTERNAL MEDICINE CLINIC | Age: 58
End: 2018-07-10

## 2018-07-10 VITALS
HEART RATE: 81 BPM | RESPIRATION RATE: 18 BRPM | SYSTOLIC BLOOD PRESSURE: 124 MMHG | WEIGHT: 224.8 LBS | TEMPERATURE: 98.3 F | HEIGHT: 70 IN | BODY MASS INDEX: 32.18 KG/M2 | DIASTOLIC BLOOD PRESSURE: 80 MMHG | OXYGEN SATURATION: 96 %

## 2018-07-10 DIAGNOSIS — I10 ESSENTIAL HYPERTENSION: ICD-10-CM

## 2018-07-10 DIAGNOSIS — Z00.00 PHYSICAL EXAM: Primary | ICD-10-CM

## 2018-07-10 DIAGNOSIS — Z00.00 PHYSICAL EXAM: ICD-10-CM

## 2018-07-10 DIAGNOSIS — R73.01 IFG (IMPAIRED FASTING GLUCOSE): ICD-10-CM

## 2018-07-10 LAB
ALBUMIN SERPL-MCNC: 4.3 G/DL (ref 3.4–5)
ALBUMIN/GLOB SERPL: 1.4 {RATIO} (ref 0.8–1.7)
ALP SERPL-CCNC: 72 U/L (ref 45–117)
ALT SERPL-CCNC: 30 U/L (ref 16–61)
ANION GAP SERPL CALC-SCNC: 8 MMOL/L (ref 3–18)
AST SERPL-CCNC: 32 U/L (ref 15–37)
BILIRUB SERPL-MCNC: 0.7 MG/DL (ref 0.2–1)
BUN SERPL-MCNC: 13 MG/DL (ref 7–18)
BUN/CREAT SERPL: 12 (ref 12–20)
CALCIUM SERPL-MCNC: 9.6 MG/DL (ref 8.5–10.1)
CHLORIDE SERPL-SCNC: 103 MMOL/L (ref 100–108)
CO2 SERPL-SCNC: 29 MMOL/L (ref 21–32)
CREAT SERPL-MCNC: 1.06 MG/DL (ref 0.6–1.3)
EST. AVERAGE GLUCOSE BLD GHB EST-MCNC: 131 MG/DL
GLOBULIN SER CALC-MCNC: 3 G/DL (ref 2–4)
GLUCOSE SERPL-MCNC: 104 MG/DL (ref 74–99)
HBA1C MFR BLD: 6.2 % (ref 4.2–5.6)
POTASSIUM SERPL-SCNC: 4.1 MMOL/L (ref 3.5–5.5)
PROT SERPL-MCNC: 7.3 G/DL (ref 6.4–8.2)
SODIUM SERPL-SCNC: 140 MMOL/L (ref 136–145)

## 2018-07-10 PROCEDURE — 80053 COMPREHEN METABOLIC PANEL: CPT | Performed by: INTERNAL MEDICINE

## 2018-07-10 PROCEDURE — 82043 UR ALBUMIN QUANTITATIVE: CPT | Performed by: INTERNAL MEDICINE

## 2018-07-10 PROCEDURE — 36415 COLL VENOUS BLD VENIPUNCTURE: CPT | Performed by: INTERNAL MEDICINE

## 2018-07-10 PROCEDURE — 83036 HEMOGLOBIN GLYCOSYLATED A1C: CPT | Performed by: INTERNAL MEDICINE

## 2018-07-10 NOTE — MR AVS SNAPSHOT
[address] [place] [address] 
 
 
 [address] [facility] 83 [address] 
[phone] Patient: Lala Chen MRN: [id number] Visit Information Date & Time Provider Department Dept. Phone Encounter #  
 7/10/2018 10:00 AM [facility] [phone] Follow-up Instructions Return in about 6 months (around 1/10/2019), or if symptoms worsen or fail to improve, for hypertension. Upcoming Health Maintenance Date Due  
 EYE EXAM RETINAL OR DILATED Q1 2/7/1972 FOBT Q 1 YEAR AGE 50-75 2/7/2015 FOOT EXAM Q1 12/16/2017 Pneumococcal 19-64 Highest Risk (2 of 3 - PCV13) 7/5/2018 MICROALBUMIN Q1 7/5/2018 HEMOGLOBIN A1C Q6M 7/26/2018 Influenza Age 5 to Adult 8/1/2018 LIPID PANEL Q1 1/26/2019 DTaP/Tdap/Td series (2 - Td) 7/5/2027 Allergies as of 7/10/2018  Review Complete On: 7/10/2018 By: Judy Butt Severity Noted Reaction Type Reactions Pcn [Penicillins]  03/20/2012    Rash Current Immunizations  Reviewed on 7/6/2018 Name Date Influenza Vaccine 11/14/2017, 10/7/2013  8:25 AM  
 Influenza Vaccine (Quad) PF 12/16/2016, 11/3/2015 Influenza Vaccine PF 10/27/2014  9:00 AM  
 Influenza Vaccine Split 11/9/2012 Tdap 7/5/2017 Not reviewed this visit You Were Diagnosed With   
  
 Codes Comments Physical exam    -  Primary ICD-10-CM: Z00.00 ICD-9-CM: V70.9 Essential hypertension     ICD-10-CM: I10 
ICD-9-CM: 401.9 IFG (impaired fasting glucose)     ICD-10-CM: R73.01 
ICD-9-CM: 790.21 Vitals BP Pulse Temp Resp Height(growth percentile) Weight(growth percentile) 124/80 (BP 1 Location: Right arm, BP Patient Position: Sitting) 81 98.3 °F (36.8 °C) (Oral) 18 5' 10\" (1.778 m) 224 lb 12.8 oz (102 kg) SpO2 BMI Smoking Status 96% 32.26 kg/m2 Current Some Day Smoker BMI and BSA Data Body Mass Index Body Surface Area  
 32.26 kg/m 2 2.24 m 2 Preferred Pharmacy Pharmacy Name Phone [id number] [address], [place] [address] ,[address] [phone] Your Updated Medication List  
  
   
This list is accurate as of 7/10/18 10:46 AM.  Always use your most recent med list.  
  
  
  
  
 entecavir 0.5 mg tablet Commonly known as:  BARACLUDE  
  
 fluticasone 50 mcg/actuation nasal spray Commonly known as:  Shelvia Birks 2 Sprays by Both Nostrils route daily. losartan-hydroCHLOROthiazide 100-12.5 mg per tablet Commonly known as:  HYZAAR  
TAKE 1 TABLET BY MOUTH DAILY  
  
 ONE-A-DAY 50 PLUS PO Take  by mouth. Follow-up Instructions Return in about 6 months (around 1/10/2019), or if symptoms worsen or fail to improve, for hypertension. To-Do List   
 07/10/2018 Lab:  HEMOGLOBIN A1C WITH EAG   
  
 07/10/2018 Lab:  METABOLIC PANEL, COMPREHENSIVE   
  
 07/10/2018 Lab:  MICROALBUMIN, UR, RAND W/ MICROALB/CREAT RATIO Patient Instructions DASH Diet: Care Instructions Your Care Instructions The DASH diet is an eating plan that can help lower your blood pressure. DASH stands for Dietary Approaches to Stop Hypertension. Hypertension is high blood pressure. The DASH diet focuses on eating foods that are high in calcium, potassium, and magnesium. These nutrients can lower blood pressure. The foods that are highest in these nutrients are fruits, vegetables, low-fat dairy products, nuts, seeds, and legumes. But taking calcium, potassium, and magnesium supplements instead of eating foods that are high in those nutrients does not have the same effect. The DASH diet also includes whole grains, fish, and poultry. The DASH diet is one of several lifestyle changes your doctor may recommend to lower your high blood pressure. Your doctor may also want you to decrease the amount of sodium in your diet.  Lowering sodium while following the DASH diet can lower blood pressure even further than just the DASH diet alone. Follow-up care is a key part of your treatment and safety. Be sure to make and go to all appointments, and call your doctor if you are having problems. It's also a good idea to know your test results and keep a list of the medicines you take. How can you care for yourself at home? Following the DASH diet · Eat 4 to 5 servings of fruit each day. A serving is 1 medium-sized piece of fruit, ½ cup chopped or canned fruit, 1/4 cup dried fruit, or 4 ounces (½ cup) of fruit juice. Choose fruit more often than fruit juice. · Eat 4 to 5 servings of vegetables each day. A serving is 1 cup of lettuce or raw leafy vegetables, ½ cup of chopped or cooked vegetables, or 4 ounces (½ cup) of vegetable juice. Choose vegetables more often than vegetable juice. · Get 2 to 3 servings of low-fat and fat-free dairy each day. A serving is 8 ounces of milk, 1 cup of yogurt, or 1 ½ ounces of cheese. · Eat 6 to 8 servings of grains each day. A serving is 1 slice of bread, 1 ounce of dry cereal, or ½ cup of cooked rice, pasta, or cooked cereal. Try to choose whole-grain products as much as possible. · Limit lean meat, poultry, and fish to 2 servings each day. A serving is 3 ounces, about the size of a deck of cards. · Eat 4 to 5 servings of nuts, seeds, and legumes (cooked dried beans, lentils, and split peas) each week. A serving is 1/3 cup of nuts, 2 tablespoons of seeds, or ½ cup of cooked beans or peas. · Limit fats and oils to 2 to 3 servings each day. A serving is 1 teaspoon of vegetable oil or 2 tablespoons of salad dressing. · Limit sweets and added sugars to 5 servings or less a week. A serving is 1 tablespoon jelly or jam, ½ cup sorbet, or 1 cup of lemonade. · Eat less than 2,300 milligrams (mg) of sodium a day. If you limit your sodium to 1,500 mg a day, you can lower your blood pressure even more. Tips for success · Start small. Do not try to make dramatic changes to your diet all at once. You might feel that you are missing out on your favorite foods and then be more likely to not follow the plan. Make small changes, and stick with them. Once those changes become habit, add a few more changes. · Try some of the following: ¨ Make it a goal to eat a fruit or vegetable at every meal and at snacks. This will make it easy to get the recommended amount of fruits and vegetables each day. ¨ Try yogurt topped with fruit and nuts for a snack or healthy dessert. ¨ Add lettuce, tomato, cucumber, and onion to sandwiches. ¨ Combine a ready-made pizza crust with low-fat mozzarella cheese and lots of vegetable toppings. Try using tomatoes, squash, spinach, broccoli, carrots, cauliflower, and onions. ¨ Have a variety of cut-up vegetables with a low-fat dip as an appetizer instead of chips and dip. ¨ Sprinkle sunflower seeds or chopped almonds over salads. Or try adding chopped walnuts or almonds to cooked vegetables. ¨ Try some vegetarian meals using beans and peas. Add garbanzo or kidney beans to salads. Make burritos and tacos with mashed strauss beans or black beans. Where can you learn more? Go to http://stephanie-leilani.info/. Enter E988 in the search box to learn more about \"DASH Diet: Care Instructions. \" Current as of: September 21, 2016 Content Version: 11.4 © 0807-5328 [employer]. Care instructions adapted under license by [employer] (which disclaims liability or warranty for this information). If you have questions about a medical condition or this instruction, always ask your healthcare professional. [place] [address] any warranty or liability for your use of this information. Introducing [facility] & HEALTH SERVICES! Dear Jigna Sánchez: Thank you for requesting a Intematix account.   Our records indicate that you already have an active DocLanding account. You can access your account anytime at [email or web address] Did you know that you can access your hospital and ER discharge instructions at any time in DocLanding? You can also review all of your test results from your hospital stay or ER visit. Additional Information If you have questions, please visit the Frequently Asked Questions section of the DocLanding website at [email or web address] Remember, DocLanding is NOT to be used for urgent needs. For medical emergencies, dial 911. Now available from your iPhone and Android! Please provide this summary of care documentation to your next provider. Your primary care clinician is listed as Anthony Gonzalse. If you have any questions after today's visit, please call [phone].
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
## 2018-07-10 NOTE — PROGRESS NOTES
ROOM # 16  Identified pt with two pt identifiers(name and ). Reviewed record in preparation for visit and have obtained necessary documentation. Chief Complaint   Patient presents with    Complete Physical      [address] [place] [address] preferred language for health care discussion is english/other. Is the patient using any DME equipment during OV? NO    Bora Alvarado is due for:  Health Maintenance Due   Topic    EYE EXAM RETINAL OR DILATED Q1     FOBT Q 1 YEAR AGE 50-75     FOOT EXAM Q1     Pneumococcal 19-64 Highest Risk (2 of 3 - PCV13)    MICROALBUMIN Q1     HEMOGLOBIN A1C Q6M      Health Maintenance reviewed and discussed per provider  Please order/place referral if appropriate. Advance Directive:  1. Do you have an advance directive in place? Patient Reply: Ama Crouch    2. If not, would you like material regarding how to put one in place? NO    Coordination of Care:  1. Have you been to the ER, urgent care clinic since your last visit? Hospitalized since your last visit? NO    2. Have you seen or consulted any other health care providers outside of the [address] [place] [address] since your last visit? Include any pap smears or colon screening. YES    Patient is accompanied by spouse I have received verbal consent from [address] [place] [address] to discuss any/all medical information while they are present in the room.     Learning Assessment:  Learning Assessment 3/4/2016 2014   PRIMARY LEARNER Patient Patient   HIGHEST LEVEL OF EDUCATION - PRIMARY LEARNER  GRADUATED HIGH SCHOOL OR GED -   BARRIERS PRIMARY LEARNER NONE -   CO-LEARNER CAREGIVER No -   PRIMARY LANGUAGE ENGLISH ENGLISH    NEED No -   LEARNER PREFERENCE PRIMARY DEMONSTRATION LISTENING   LEARNING SPECIAL TOPICS no -   ANSWERED BY patient patient   RELATIONSHIP SELF SELF   ASSESSMENT COMMENT none -     Depression Screening:  PHQ over the last two weeks 7/10/2018 2017 4/3/2017 2016 2015 2014 2/7/2014   Little interest or pleasure in doing things Not at all Not at all Not at all Not at all Not at all Not at all Not at all   Feeling down, depressed or hopeless Not at all Not at all Not at all Not at all Not at all Not at all Not at all   Total Score PHQ 2 0 0 0 0 0 0 0     Abuse Screening:  No flowsheet data found. Fall Risk  Fall Risk Assessment, last 12 mths 6/25/2014   Able to walk? Yes   Fall in past 12 months?  No

## 2018-07-10 NOTE — PATIENT INSTRUCTIONS

## 2018-07-11 LAB
CREAT UR-MCNC: 20.36 MG/DL (ref 30–125)
MICROALBUMIN UR-MCNC: <0.13 MG/DL (ref 0–3)
MICROALBUMIN/CREAT UR-RTO: ABNORMAL MG/G (ref 0–30)

## 2018-10-31 ENCOUNTER — DOCUMENTATION ONLY (OUTPATIENT)
Dept: INTERNAL MEDICINE CLINIC | Age: 58
End: 2018-10-31

## 2018-10-31 ENCOUNTER — CLINICAL SUPPORT (OUTPATIENT)
Dept: INTERNAL MEDICINE CLINIC | Age: 58
End: 2018-10-31

## 2018-10-31 VITALS — TEMPERATURE: 96.7 F

## 2018-10-31 DIAGNOSIS — Z23 ENCOUNTER FOR IMMUNIZATION: Primary | ICD-10-CM

## 2018-10-31 NOTE — PROGRESS NOTES
flu vaccine first dose administered in rt deltoid with patient's consent. Patient observed for 10 minutes, no reaction noted at present time. Patient tolerated procedure well and left without any complaints. Patient received flu VIS sheet and verbalized understanding.

## 2019-01-10 ENCOUNTER — OFFICE VISIT (OUTPATIENT)
Dept: INTERNAL MEDICINE CLINIC | Age: 59
End: 2019-01-10

## 2019-01-10 VITALS
SYSTOLIC BLOOD PRESSURE: 149 MMHG | TEMPERATURE: 95.7 F | DIASTOLIC BLOOD PRESSURE: 75 MMHG | BODY MASS INDEX: 33.7 KG/M2 | OXYGEN SATURATION: 99 % | RESPIRATION RATE: 18 BRPM | HEART RATE: 74 BPM | HEIGHT: 70 IN | WEIGHT: 235.4 LBS

## 2019-01-10 DIAGNOSIS — I10 ESSENTIAL HYPERTENSION: Primary | ICD-10-CM

## 2019-01-10 DIAGNOSIS — L98.9 SKIN LESION OF LEFT ARM: ICD-10-CM

## 2019-01-10 DIAGNOSIS — R73.01 IFG (IMPAIRED FASTING GLUCOSE): ICD-10-CM

## 2019-01-10 RX ORDER — LOSARTAN POTASSIUM AND HYDROCHLOROTHIAZIDE 12.5; 1 MG/1; MG/1
0.5 TABLET ORAL DAILY
Qty: 45 TAB | Refills: 3
Start: 2019-01-10 | End: 2019-03-13 | Stop reason: SDUPTHER

## 2019-01-10 NOTE — PROGRESS NOTES
ROOM # 16 Identified pt with two pt identifiers(name and ). Reviewed record in preparation for visit and have obtained necessary documentation. Chief Complaint Patient presents with  Hypertension Ole Combs preferred language for health care discussion is english/other. Is the patient using any DME equipment during OV? NO Ole Combs is due for: 
Health Maintenance Due Topic  
 EYE EXAM RETINAL OR DILATED  Shingrix Vaccine Age 50> (1 of 2)  FOBT Q 1 YEAR AGE 50-75   
 FOOT EXAM Q1   
 Pneumococcal 19-64 Highest Risk (2 of 3 - PCV13)  HEMOGLOBIN A1C Q6M   
 LIPID PANEL Q1 Health Maintenance reviewed and discussed per provider Please order/place referral if appropriate. Advance Directive: 1. Do you have an advance directive in place? Patient Reply: Feroz Lubin 
 
2. If not, would you like material regarding how to put one in place? NO Coordination of Care: 1. Have you been to the ER, urgent care clinic since your last visit? Hospitalized since your last visit? NO 
 
2. Have you seen or consulted any other health care providers outside of the [address] [place] [address] since your last visit? Include any pap smears or colon screening. NO Patient is accompanied by self I have received verbal consent from Ole Combs to discuss any/all medical information while they are present in the room. Learning Assessment: 
Learning Assessment 3/4/2016 2014 PRIMARY LEARNER Patient Patient HIGHEST LEVEL OF EDUCATION - PRIMARY LEARNER  GRADUATED HIGH SCHOOL OR GED -  
BARRIERS PRIMARY LEARNER NONE -  
CO-LEARNER CAREGIVER No - PRIMARY LANGUAGE ENGLISH ENGLISH  NEED No -  
LEARNER PREFERENCE PRIMARY DEMONSTRATION LISTENING  
LEARNING SPECIAL TOPICS no -  
ANSWERED BY patient patient RELATIONSHIP SELF SELF  
ASSESSMENT COMMENT none -  
 
Depression Screening: PHQ over the last two weeks 1/10/2019 7/10/2018 2017 4/3/2017 12/16/2016 7/2/2015 6/25/2014 Little interest or pleasure in doing things Not at all Not at all Not at all Not at all Not at all Not at all Not at all Feeling down, depressed, irritable, or hopeless Not at all Not at all Not at all Not at all Not at all Not at all Not at all Total Score PHQ 2 0 0 0 0 0 0 0 Abuse Screening: No flowsheet data found. Fall Risk Fall Risk Assessment, last 12 mths 6/25/2014 Able to walk? Yes Fall in past 12 months?  No

## 2019-01-10 NOTE — PATIENT INSTRUCTIONS
Low Sodium Diet (2,000 Milligram): Care Instructions Your Care Instructions Too much sodium causes your body to hold on to extra water. This can raise your blood pressure and force your heart and kidneys to work harder. In very serious cases, this could cause you to be put in the hospital. It might even be life-threatening. By limiting sodium, you will feel better and lower your risk of serious problems. The most common source of sodium is salt. People get most of the salt in their diet from canned, prepared, and packaged foods. Fast food and restaurant meals also are very high in sodium. Your doctor will probably limit your sodium to less than 2,000 milligrams (mg) a day. This limit counts all the sodium in prepared and packaged foods and any salt you add to your food. Follow-up care is a key part of your treatment and safety. Be sure to make and go to all appointments, and call your doctor if you are having problems. It's also a good idea to know your test results and keep a list of the medicines you take. How can you care for yourself at home? Read food labels · Read labels on cans and food packages. The labels tell you how much sodium is in each serving. Make sure that you look at the serving size. If you eat more than the serving size, you have eaten more sodium. · Food labels also tell you the Percent Daily Value for sodium. Choose products with low Percent Daily Values for sodium. · Be aware that sodium can come in forms other than salt, including monosodium glutamate (MSG), sodium citrate, and sodium bicarbonate (baking soda). MSG is often added to Asian food. When you eat out, you can sometimes ask for food without MSG or added salt. Buy low-sodium foods · Buy foods that are labeled \"unsalted\" (no salt added), \"sodium-free\" (less than 5 mg of sodium per serving), or \"low-sodium\" (less than 140 mg of sodium per serving).  Foods labeled \"reduced-sodium\" and \"light sodium\" may still have too much sodium. Be sure to read the label to see how much sodium you are getting. · Buy fresh vegetables, or frozen vegetables without added sauces. Buy low-sodium versions of canned vegetables, soups, and other canned goods. Prepare low-sodium meals · Cut back on the amount of salt you use in cooking. This will help you adjust to the taste. Do not add salt after cooking. One teaspoon of salt has about 2,300 mg of sodium. · Take the salt shaker off the table. · Flavor your food with garlic, lemon juice, onion, vinegar, herbs, and spices. Do not use soy sauce, lite soy sauce, steak sauce, onion salt, garlic salt, celery salt, mustard, or ketchup on your food. · Use low-sodium salad dressings, sauces, and ketchup. Or make your own salad dressings and sauces without adding salt. · Use less salt (or none) when recipes call for it. You can often use half the salt a recipe calls for without losing flavor. Other foods such as rice, pasta, and grains do not need added salt. · Rinse canned vegetables, and cook them in fresh water. This removes somebut not allof the salt. · Avoid water that is naturally high in sodium or that has been treated with water softeners, which add sodium. Call your local water company to find out the sodium content of your water supply. If you buy bottled water, read the label and choose a sodium-free brand. Avoid high-sodium foods · Avoid eating: 
? Smoked, cured, salted, and canned meat, fish, and poultry. ? Ham, meléndez, hot dogs, and luncheon meats. ? Regular, hard, and processed cheese and regular peanut butter. ? Crackers with salted tops, and other salted snack foods such as pretzels, chips, and salted popcorn. ? Frozen prepared meals, unless labeled low-sodium. ? Canned and dried soups, broths, and bouillon, unless labeled sodium-free or low-sodium. ? Canned vegetables, unless labeled sodium-free or low-sodium. ? Western Noelle fries, pizza, tacos, and other fast foods. ? Pickles, olives, ketchup, and other condiments, especially soy sauce, unless labeled sodium-free or low-sodium. Where can you learn more? Go to http://stephanie-leilani.info/. Enter M153 in the search box to learn more about \"Low Sodium Diet (2,000 Milligram): Care Instructions. \" Current as of: March 29, 2018 Content Version: 11.8 © 4112-9247 [employer]. Care instructions adapted under license by [employer] (which disclaims liability or warranty for this information). If you have questions about a medical condition or this instruction, always ask your healthcare professional. [address] any warranty or liability for your use of this information.
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
## 2019-03-11 RX ORDER — LOSARTAN POTASSIUM AND HYDROCHLOROTHIAZIDE 12.5; 1 MG/1; MG/1
TABLET ORAL
Qty: 90 TAB | Refills: 4 | Status: SHIPPED | OUTPATIENT
Start: 2019-03-11 | End: 2019-03-13 | Stop reason: SDUPTHER

## 2019-03-13 RX ORDER — LOSARTAN POTASSIUM AND HYDROCHLOROTHIAZIDE 12.5; 1 MG/1; MG/1
0.5 TABLET ORAL DAILY
Qty: 45 TAB | Refills: 3 | Status: SHIPPED | OUTPATIENT
Start: 2019-03-13 | End: 2020-01-01

## 2019-03-13 NOTE — TELEPHONE ENCOUNTER
Requested Prescriptions     Pending Prescriptions Disp Refills    losartan-hydroCHLOROthiazide (HYZAAR) 100-12.5 mg per tablet 45 Tab 3     Sig: Take 0.5 Tabs by mouth daily.

## 2019-07-16 ENCOUNTER — OFFICE VISIT (OUTPATIENT)
Dept: INTERNAL MEDICINE CLINIC | Age: 59
End: 2019-07-16

## 2019-07-16 VITALS
RESPIRATION RATE: 18 BRPM | HEART RATE: 75 BPM | TEMPERATURE: 96.8 F | SYSTOLIC BLOOD PRESSURE: 128 MMHG | BODY MASS INDEX: 31.78 KG/M2 | DIASTOLIC BLOOD PRESSURE: 81 MMHG | OXYGEN SATURATION: 100 % | WEIGHT: 222 LBS | HEIGHT: 70 IN

## 2019-07-16 DIAGNOSIS — I10 ESSENTIAL HYPERTENSION: Primary | ICD-10-CM

## 2019-07-16 DIAGNOSIS — C85.90 LYMPHOMA, UNSPECIFIED BODY REGION, UNSPECIFIED LYMPHOMA TYPE (HCC): ICD-10-CM

## 2019-07-16 DIAGNOSIS — R73.01 IFG (IMPAIRED FASTING GLUCOSE): ICD-10-CM

## 2019-07-16 LAB — HBA1C MFR BLD HPLC: 5.9 %
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
## 2019-07-16 NOTE — PATIENT INSTRUCTIONS

## 2020-01-01 RX ORDER — LOSARTAN POTASSIUM AND HYDROCHLOROTHIAZIDE 12.5; 1 MG/1; MG/1
TABLET ORAL
Qty: 45 TAB | Refills: 3 | Status: SHIPPED | OUTPATIENT
Start: 2020-01-01

## 2020-01-15 ENCOUNTER — HOSPITAL ENCOUNTER (OUTPATIENT)
Dept: LAB | Age: 60
Discharge: HOME | End: 2020-01-15
Payer: COMMERCIAL

## 2020-01-15 ENCOUNTER — OFFICE VISIT (OUTPATIENT)
Dept: INTERNAL MEDICINE CLINIC | Age: 60
End: 2020-01-15

## 2020-01-15 VITALS
TEMPERATURE: 98.6 F | HEART RATE: 85 BPM | OXYGEN SATURATION: 97 % | WEIGHT: 223 LBS | BODY MASS INDEX: 31.92 KG/M2 | SYSTOLIC BLOOD PRESSURE: 128 MMHG | HEIGHT: 70 IN | DIASTOLIC BLOOD PRESSURE: 83 MMHG | RESPIRATION RATE: 14 BRPM

## 2020-01-15 DIAGNOSIS — Z23 ENCOUNTER FOR IMMUNIZATION: ICD-10-CM

## 2020-01-15 DIAGNOSIS — I10 ESSENTIAL HYPERTENSION: Primary | ICD-10-CM

## 2020-01-15 DIAGNOSIS — Z12.5 SPECIAL SCREENING FOR MALIGNANT NEOPLASM OF PROSTATE: ICD-10-CM

## 2020-01-15 DIAGNOSIS — R73.03 PREDIABETES: ICD-10-CM

## 2020-01-15 DIAGNOSIS — I10 ESSENTIAL HYPERTENSION: ICD-10-CM

## 2020-01-15 DIAGNOSIS — E78.5 HYPERLIPIDEMIA, UNSPECIFIED HYPERLIPIDEMIA TYPE: ICD-10-CM

## 2020-01-15 LAB
ALBUMIN SERPL-MCNC: 3.9 G/DL (ref 3.4–5)
ALBUMIN/GLOB SERPL: 1.3 {RATIO} (ref 0.8–1.7)
ALP SERPL-CCNC: 77 U/L (ref 45–117)
ALT SERPL-CCNC: 22 U/L (ref 16–61)
ANION GAP SERPL CALC-SCNC: 7 MMOL/L (ref 3–18)
APPEARANCE UR: CLEAR
AST SERPL-CCNC: 22 U/L (ref 10–38)
BILIRUB SERPL-MCNC: 1.1 MG/DL (ref 0.2–1)
BILIRUB UR QL: NEGATIVE
BUN SERPL-MCNC: 13 MG/DL (ref 7–18)
BUN/CREAT SERPL: 12 (ref 12–20)
CALCIUM SERPL-MCNC: 9 MG/DL (ref 8.5–10.1)
CHLORIDE SERPL-SCNC: 103 MMOL/L (ref 100–111)
CHOLEST SERPL-MCNC: 119 MG/DL
CO2 SERPL-SCNC: 29 MMOL/L (ref 21–32)
COLOR UR: YELLOW
CREAT SERPL-MCNC: 1.13 MG/DL (ref 0.6–1.3)
GLOBULIN SER CALC-MCNC: 2.9 G/DL (ref 2–4)
GLUCOSE SERPL-MCNC: 84 MG/DL (ref 74–99)
GLUCOSE UR STRIP.AUTO-MCNC: NEGATIVE MG/DL
HBA1C MFR BLD: 5.6 % (ref 4.2–5.6)
HDLC SERPL-MCNC: 43 MG/DL (ref 40–60)
HDLC SERPL: 2.8 {RATIO} (ref 0–5)
HGB UR QL STRIP: NEGATIVE
KETONES UR QL STRIP.AUTO: NEGATIVE MG/DL
LDLC SERPL CALC-MCNC: 56 MG/DL (ref 0–100)
LEUKOCYTE ESTERASE UR QL STRIP.AUTO: NEGATIVE
LIPID PROFILE,FLP: NORMAL
NITRITE UR QL STRIP.AUTO: NEGATIVE
PH UR STRIP: 7.5 [PH] (ref 5–8)
POTASSIUM SERPL-SCNC: 3.7 MMOL/L (ref 3.5–5.5)
PROT SERPL-MCNC: 6.8 G/DL (ref 6.4–8.2)
PROT UR STRIP-MCNC: NEGATIVE MG/DL
PSA SERPL-MCNC: 0.6 NG/ML (ref 0–4)
SODIUM SERPL-SCNC: 139 MMOL/L (ref 136–145)
SP GR UR REFRACTOMETRY: 1.01 (ref 1–1.03)
TRIGL SERPL-MCNC: 100 MG/DL (ref ?–150)
UROBILINOGEN UR QL STRIP.AUTO: 0.2 EU/DL (ref 0.2–1)
VLDLC SERPL CALC-MCNC: 20 MG/DL

## 2020-01-15 PROCEDURE — 80053 COMPREHEN METABOLIC PANEL: CPT

## 2020-01-15 PROCEDURE — 83036 HEMOGLOBIN GLYCOSYLATED A1C: CPT

## 2020-01-15 PROCEDURE — 81003 URINALYSIS AUTO W/O SCOPE: CPT

## 2020-01-15 PROCEDURE — 36415 COLL VENOUS BLD VENIPUNCTURE: CPT

## 2020-01-15 PROCEDURE — 80061 LIPID PANEL: CPT

## 2020-01-15 PROCEDURE — 84153 ASSAY OF PSA TOTAL: CPT

## 2020-01-15 RX ORDER — FLUTICASONE PROPIONATE 50 MCG
2 SPRAY, SUSPENSION (ML) NASAL DAILY
Qty: 1 BOTTLE | Refills: 3 | Status: SHIPPED | OUTPATIENT
Start: 2020-01-15 | End: 2021-01-01 | Stop reason: SDUPTHER

## 2020-01-15 NOTE — PROGRESS NOTES
ROOM # 11 Boris Walker presents today for Chief Complaint Patient presents with  Establish Care  
  former pt of Dr. Hussein Gill  Chemotherapy Non-Hodgkin's Lymphoma 2016 RT axilla LN Boris Walker preferred language for health care discussion is english/other. Is someone accompanying this pt? No 
 
Is the patient using any DME equipment during OV? Depression Screening: 
3 most recent [facility] Screens 1/15/2020 7/16/2019 1/10/2019 7/10/2018 7/5/2017 4/3/2017 12/16/2016 Little interest or pleasure in doing things Not at all Not at all Not at all Not at all Not at all Not at all Not at all Feeling down, depressed, irritable, or hopeless Not at all Not at all Not at all Not at all Not at all Not at all Not at all Total Score PHQ 2 0 0 0 0 0 0 0 Learning Assessment: 
Learning Assessment 3/4/2016 6/25/2014 PRIMARY LEARNER Patient Patient HIGHEST LEVEL OF EDUCATION - PRIMARY LEARNER  GRADUATED HIGH SCHOOL OR GED -  
BARRIERS PRIMARY LEARNER NONE -  
CO-LEARNER CAREGIVER No - PRIMARY LANGUAGE ENGLISH ENGLISH  NEED No -  
LEARNER PREFERENCE PRIMARY DEMONSTRATION LISTENING  
LEARNING SPECIAL TOPICS no -  
ANSWERED BY patient patient RELATIONSHIP SELF SELF  
ASSESSMENT COMMENT none -  
 
 
Abuse Screening: No flowsheet data found. Fall Risk Fall Risk Assessment, last 12 mths 6/25/2014 Able to walk? Yes Fall in past 12 months? No  
 
 
Health Maintenance reviewed and discussed per provider. Yes Boris Walker is due for Health Maintenance Due Topic Date Due  Pneumococcal 0-64 years (1 of 3 - PCV13) 02/07/1968  
 EYE EXAM RETINAL OR DILATED  02/07/1972  Shingrix Vaccine Age 50> (1 of 2) 02/07/2012  FOBT Q 1 YEAR AGE 50-75  02/07/2015  
 FOOT EXAM Q1  12/16/2017  LIPID PANEL Q1  01/26/2019  MICROALBUMIN Q1  07/10/2019  Influenza Age 5 to Adult  08/01/2019  
 HEMOGLOBIN A1C Q6M  01/16/2020 Please order/place referral if appropriate. Advance Directive: 1. Do you have an advance directive in place? Patient Reply:YES 
 
2. If not, would you like material regarding how to put one in place? Patient Reply: NO 
 
Coordination of Care: 1. Have you been to the ER, urgent care clinic since your last visit? Hospitalized since your last visit? NO 
 
2. Have you seen or consulted any other health care providers outside of the [address] [place] [address] since your last visit? Include any pap smears or colon screening. CHEMOTHERAPY

## 2020-01-15 NOTE — PROGRESS NOTES
HISTORY OF PRESENT ILLNESS Igor Moncada is a 64 y.o. male. HPI Presents to establish with me. 1) HTN - well-controlled with Losartan 50/6.25 (1/2 of 100/12.5 mg tab) BP Readings from Last 3 Encounters:  
01/15/20 128/83  
07/16/19 128/81  
01/10/19 149/75 2) Non-Hodgkin's Lymphoma - follows with [facility]. Resumed chemo in 2019. - Taking oral chemo and monthly injection.  
- Recent CT scan with some areas so planned for additional 3 months of chemo. Next appt late Jan 2020.  
- See recent scanned visit note. 3) Prediabetes - improved with recent weight loss. Lab Results Component Value Date/Time Hemoglobin A1c 6.2 (H) 07/10/2018 10:56 AM  
 Hemoglobin A1c (POC) 5.9 07/16/2019 07:40 AM  
 
 
 
Review of Systems HENT: Positive for congestion. Respiratory: Positive for cough (mild x few weeks; worse in am). Negative for shortness of breath. Cardiovascular: Negative for chest pain and leg swelling. Musculoskeletal: Positive for myalgias (some increased cramps hands and feet - improved with OTC cream). Neurological: Negative for dizziness and headaches. Visit Vitals /83 (BP 1 Location: Right arm, BP Patient Position: Sitting) Pulse 85 Temp 98.6 °F (37 °C) Resp 14 Ht 5' 10\" (1.778 m) Wt 223 lb (101.2 kg) SpO2 97% BMI 32.00 kg/m² Wt Readings from Last 3 Encounters:  
01/15/20 223 lb (101.2 kg) 07/16/19 222 lb (100.7 kg) 01/10/19 235 lb 6.4 oz (106.8 kg) Physical Exam 
Constitutional:   
   General: He is not in acute distress. Appearance: Normal appearance. He is well-developed. HENT:  
   Head: Normocephalic and atraumatic. Right Ear: Tympanic membrane, ear canal and external ear normal.  
   Left Ear: Tympanic membrane, ear canal and external ear normal.  
   Nose: Nose normal.  
   Mouth/Throat:  
   Mouth: Mucous membranes are moist.  
   Pharynx: Uvula midline.  No oropharyngeal exudate or posterior oropharyngeal erythema. Tonsils: No tonsillar abscesses. Eyes:  
   General: No scleral icterus. Conjunctiva/sclera: Conjunctivae normal.  
   Pupils: Pupils are equal, round, and reactive to light. Neck: Musculoskeletal: Neck supple. Cardiovascular:  
   Rate and Rhythm: Normal rate and regular rhythm. Pulses: Normal pulses. Dorsalis pedis pulses are 2+ on the right side and 2+ on the left side. Posterior tibial pulses are 2+ on the right side and 2+ on the left side. Heart sounds: Normal heart sounds. No murmur. No gallop. Pulmonary:  
   Effort: Pulmonary effort is normal. No respiratory distress. Breath sounds: Normal breath sounds. No decreased breath sounds, wheezing, rhonchi or rales. Musculoskeletal:  
   Right lower leg: No edema. Left lower leg: No edema. Lymphadenopathy:  
   Head:  
   Right side of head: No submandibular or tonsillar adenopathy. Left side of head: No submandibular or tonsillar adenopathy. Cervical: No cervical adenopathy. Upper Body:  
   Right upper body: No supraclavicular adenopathy. Left upper body: No supraclavicular adenopathy. Skin: 
   General: Skin is warm and dry. Neurological:  
   Mental Status: He is alert and oriented to person, place, and time. Psychiatric:     
   Speech: Speech normal.  
 
 
 
ASSESSMENT and PLAN Diagnoses and all orders for this visit: 1. Essential hypertension -     METABOLIC PANEL, COMPREHENSIVE; Future -     URINALYSIS W/ RFLX MICROSCOPIC; Future - Continue Losartan 50/6.25 daily. 2. Hyperlipidemia, unspecified hyperlipidemia type -     LIPID PANEL; Future 
 - Nonfasting. 3. Prediabetes 
-     HEMOGLOBIN A1C W/O EAG; Future 4. Special screening for malignant neoplasm of prostate -     PSA SCREENING (SCREENING); Future 5.  Encounter for immunization 
-     PNEUMOCOCCAL CONJ VACCINE 13 VALENT IM 
 -     NM IMMUNIZ ADMIN,1 SINGLE/COMB VAC/TOXOID Other orders 
-     fluticasone propionate (FLONASE) 50 mcg/actuation nasal spray; 2 Sprays by Both Nostrils route daily. - refilled for post-nasal drip. Follow-up and Dispositions · Return in about 6 months (around 7/15/2020) for follow-up.

## 2020-01-31 ENCOUNTER — TELEPHONE (OUTPATIENT)
Dept: INTERNAL MEDICINE CLINIC | Age: 60
End: 2020-01-31

## 2020-01-31 NOTE — TELEPHONE ENCOUNTER
Received call from pt stating that since his last visit on 1/15 that he has been experiencing persistant cough, chills, and fever. Pt was requesting a office visit today. I advised pt that there were no available appts today. I suggested that pt go to urgent care. Pt stated that he would go there now. Pt had no further questions or concerns.

## 2021-01-01 ENCOUNTER — TELEPHONE (OUTPATIENT)
Dept: INTERNAL MEDICINE CLINIC | Age: 61
End: 2021-01-01

## 2021-01-01 ENCOUNTER — HOSPITAL ENCOUNTER (OUTPATIENT)
Dept: LAB | Age: 61
Discharge: HOME | End: 2021-03-12
Payer: COMMERCIAL

## 2021-01-01 ENCOUNTER — OFFICE VISIT (OUTPATIENT)
Dept: INTERNAL MEDICINE CLINIC | Age: 61
End: 2021-01-01

## 2021-01-01 ENCOUNTER — DOCUMENTATION ONLY (OUTPATIENT)
Dept: INTERNAL MEDICINE CLINIC | Age: 61
End: 2021-01-01

## 2021-01-01 VITALS
DIASTOLIC BLOOD PRESSURE: 75 MMHG | HEIGHT: 70 IN | OXYGEN SATURATION: 98 % | HEART RATE: 92 BPM | WEIGHT: 210 LBS | BODY MASS INDEX: 30.06 KG/M2 | SYSTOLIC BLOOD PRESSURE: 109 MMHG | TEMPERATURE: 97.4 F | RESPIRATION RATE: 18 BRPM

## 2021-01-01 VITALS
WEIGHT: 210 LBS | OXYGEN SATURATION: 98 % | HEART RATE: 92 BPM | RESPIRATION RATE: 18 BRPM | TEMPERATURE: 97.4 F | SYSTOLIC BLOOD PRESSURE: 109 MMHG | DIASTOLIC BLOOD PRESSURE: 75 MMHG | HEIGHT: 70 IN | BODY MASS INDEX: 30.06 KG/M2

## 2021-01-01 DIAGNOSIS — R73.03 PREDIABETES: ICD-10-CM

## 2021-01-01 DIAGNOSIS — R53.81 MALAISE: ICD-10-CM

## 2021-01-01 DIAGNOSIS — R05.9 COUGH: Primary | ICD-10-CM

## 2021-01-01 DIAGNOSIS — I10 ESSENTIAL HYPERTENSION: ICD-10-CM

## 2021-01-01 DIAGNOSIS — Z12.5 PROSTATE CANCER SCREENING: ICD-10-CM

## 2021-01-01 DIAGNOSIS — R06.02 SOB (SHORTNESS OF BREATH): ICD-10-CM

## 2021-01-01 DIAGNOSIS — Z86.16 HISTORY OF 2019 NOVEL CORONAVIRUS DISEASE (COVID-19): ICD-10-CM

## 2021-01-01 LAB
ALBUMIN SERPL-MCNC: 3.3 G/DL (ref 3.4–5)
ALBUMIN/GLOB SERPL: 1 {RATIO} (ref 0.8–1.7)
ALP SERPL-CCNC: 77 U/L (ref 45–117)
ALT SERPL-CCNC: 29 U/L (ref 16–61)
ANION GAP SERPL CALC-SCNC: 3 MMOL/L (ref 3–18)
AST SERPL-CCNC: 32 U/L (ref 10–38)
BASOPHILS # BLD: 0 K/UL (ref 0–0.1)
BASOPHILS NFR BLD: 0 % (ref 0–2)
BILIRUB SERPL-MCNC: 0.5 MG/DL (ref 0.2–1)
BUN SERPL-MCNC: 10 MG/DL (ref 7–18)
BUN/CREAT SERPL: 11 (ref 12–20)
CALCIUM SERPL-MCNC: 8.5 MG/DL (ref 8.5–10.1)
CHLORIDE SERPL-SCNC: 103 MMOL/L (ref 100–111)
CHOLEST SERPL-MCNC: 114 MG/DL
CO2 SERPL-SCNC: 30 MMOL/L (ref 21–32)
CREAT SERPL-MCNC: 0.9 MG/DL (ref 0.6–1.3)
CREAT UR-MCNC: 167 MG/DL (ref 30–125)
DIFFERENTIAL METHOD BLD: ABNORMAL
EOSINOPHIL # BLD: 0 K/UL (ref 0–0.4)
EOSINOPHIL NFR BLD: 1 % (ref 0–5)
ERYTHROCYTE [DISTWIDTH] IN BLOOD BY AUTOMATED COUNT: 14 % (ref 11.6–14.5)
GLOBULIN SER CALC-MCNC: 3.2 G/DL (ref 2–4)
GLUCOSE SERPL-MCNC: 104 MG/DL (ref 74–99)
HBA1C MFR BLD: 6.6 % (ref 4.2–5.6)
HCT VFR BLD AUTO: 38.4 % (ref 36–48)
HDLC SERPL-MCNC: 31 MG/DL (ref 40–60)
HDLC SERPL: 3.7 {RATIO} (ref 0–5)
HGB BLD-MCNC: 12.9 G/DL (ref 13–16)
LDLC SERPL CALC-MCNC: 62.2 MG/DL (ref 0–100)
LIPID PROFILE,FLP: ABNORMAL
LYMPHOCYTES # BLD: 0.4 K/UL (ref 0.9–3.6)
LYMPHOCYTES NFR BLD: 7 % (ref 21–52)
MCH RBC QN AUTO: 27.9 PG (ref 24–34)
MCHC RBC AUTO-ENTMCNC: 33.6 G/DL (ref 31–37)
MCV RBC AUTO: 82.9 FL (ref 74–97)
MICROALBUMIN UR-MCNC: 4.51 MG/DL (ref 0–3)
MICROALBUMIN/CREAT UR-RTO: 27 MG/G (ref 0–30)
MONOCYTES # BLD: 0.6 K/UL (ref 0.05–1.2)
MONOCYTES NFR BLD: 11 % (ref 3–10)
NEUTS SEG # BLD: 4.3 K/UL (ref 1.8–8)
NEUTS SEG NFR BLD: 81 % (ref 40–73)
PLATELET # BLD AUTO: 303 K/UL (ref 135–420)
PMV BLD AUTO: 9.9 FL (ref 9.2–11.8)
POTASSIUM SERPL-SCNC: 3.8 MMOL/L (ref 3.5–5.5)
PROT SERPL-MCNC: 6.5 G/DL (ref 6.4–8.2)
PSA SERPL-MCNC: 0.5 NG/ML (ref 0–4)
RBC # BLD AUTO: 4.63 M/UL (ref 4.7–5.5)
SODIUM SERPL-SCNC: 136 MMOL/L (ref 136–145)
TRIGL SERPL-MCNC: 104 MG/DL (ref ?–150)
TSH SERPL DL<=0.05 MIU/L-ACNC: 1.55 UIU/ML (ref 0.36–3.74)
VLDLC SERPL CALC-MCNC: 20.8 MG/DL
WBC # BLD AUTO: 5.4 K/UL (ref 4.6–13.2)

## 2021-01-01 PROCEDURE — 82043 UR ALBUMIN QUANTITATIVE: CPT

## 2021-01-01 PROCEDURE — 84443 ASSAY THYROID STIM HORMONE: CPT

## 2021-01-01 PROCEDURE — 80061 LIPID PANEL: CPT

## 2021-01-01 PROCEDURE — 36415 COLL VENOUS BLD VENIPUNCTURE: CPT

## 2021-01-01 PROCEDURE — 80053 COMPREHEN METABOLIC PANEL: CPT

## 2021-01-01 PROCEDURE — 85025 COMPLETE CBC W/AUTO DIFF WBC: CPT

## 2021-01-01 PROCEDURE — 99214 OFFICE O/P EST MOD 30 MIN: CPT | Performed by: PHYSICIAN ASSISTANT

## 2021-01-01 PROCEDURE — 83036 HEMOGLOBIN GLYCOSYLATED A1C: CPT

## 2021-01-01 PROCEDURE — 84153 ASSAY OF PSA TOTAL: CPT

## 2021-01-01 RX ORDER — CEFDINIR 300 MG/1
300 CAPSULE ORAL 2 TIMES DAILY
Qty: 14 CAP | Refills: 0 | Status: SHIPPED | OUTPATIENT
Start: 2021-01-01 | End: 2021-01-01

## 2021-01-01 RX ORDER — ENTECAVIR 0.5 MG/1
TABLET, FILM COATED ORAL
COMMUNITY
Start: 2021-01-01

## 2021-01-01 RX ORDER — FLUTICASONE PROPIONATE 50 MCG
2 SPRAY, SUSPENSION (ML) NASAL DAILY
Qty: 1 BOTTLE | Refills: 3 | Status: SHIPPED | OUTPATIENT
Start: 2021-01-01

## 2021-01-01 RX ORDER — GUAIFENESIN 100 MG/5ML
81 LIQUID (ML) ORAL DAILY
COMMUNITY

## 2021-01-01 RX ORDER — AZITHROMYCIN 250 MG/1
TABLET, FILM COATED ORAL
Qty: 6 TAB | Refills: 0 | Status: SHIPPED | OUTPATIENT
Start: 2021-01-01 | End: 2021-01-01

## 2021-03-12 NOTE — TELEPHONE ENCOUNTER
CXR result concerning for pneumonia. Patient contacted in regard to result. I am concerned about a secondary bacterial pneumonia, so will send both Omnicef and Azithromycin to patient's pharmacy (hx of smoking). He agrees. Questions answered. F/u as previously advised.

## 2021-03-12 NOTE — PROGRESS NOTES
HISTORY OF PRESENT ILLNESS Viktoriya Lama is a 63 y.o. male. HPI Routine f/u.  
 
1) HTN -  
 
2) Non-Hodgkin's Lymphoma -  
 
ROS Visit Vitals /75 (BP 1 Location: Right upper arm, BP Patient Position: Sitting, BP Cuff Size: Large adult) Pulse 92 Temp 97.4 °F (36.3 °C) (Oral) Resp 18 Ht 5' 10\" (1.778 m) Wt 210 lb (95.3 kg) SpO2 98% BMI 30.13 kg/m² Physical Exam 
 
ASSESSMENT and PLAN 
{ASSESSMENT/PLAN:[id number]}

## 2021-03-12 NOTE — PROGRESS NOTES
HISTORY OF PRESENT ILLNESS Arnaldo Crocker is a 63 y.o. male. HPI Presents with concern for continued symptoms from recent COVID infection. Diagnosed on 2/14/21. C/o continued low-grade fever, chills, cough, severe fatigue, and mild SOB. Symptoms occur primarily at night. He was not hospitalized for COVID. 2) Hx of smoking - none in 3 weeks. 3) HTN - controlled. 4) Hx lymphoma - appt next week with oncology. Review of Systems Constitutional: Positive for chills (nighttime), fever (101-102 last night) and malaise/fatigue. Respiratory: Positive for cough (nighttime), sputum production and shortness of breath. Visit Vitals /75 Pulse 92 Temp 97.4 °F (36.3 °C) (Oral) Resp 18 Ht 5' 10\" (1.778 m) Wt 210 lb (95.3 kg) SpO2 98% BMI 30.13 kg/m² Physical Exam 
Constitutional:   
   General: He is not in acute distress. Appearance: Normal appearance. He is well-developed. HENT:  
   Head: Normocephalic and atraumatic. Right Ear: Tympanic membrane, ear canal and external ear normal.  
   Left Ear: Tympanic membrane, ear canal and external ear normal.  
   Nose: Nose normal.  
   Mouth/Throat:  
   Comments: Mask Eyes:  
   General: No scleral icterus. Conjunctiva/sclera: Conjunctivae normal.  
   Pupils: Pupils are equal, round, and reactive to light. Neck: Musculoskeletal: Neck supple. Cardiovascular:  
   Rate and Rhythm: Normal rate and regular rhythm. Pulses: Normal pulses. Dorsalis pedis pulses are 2+ on the right side and 2+ on the left side. Posterior tibial pulses are 2+ on the right side and 2+ on the left side. Heart sounds: Normal heart sounds. No murmur. No gallop. Pulmonary:  
   Effort: Pulmonary effort is normal. No respiratory distress. Breath sounds: Normal breath sounds. No decreased breath sounds, wheezing, rhonchi or rales. Musculoskeletal:  
   Right lower leg: No edema.   
   Left lower leg: No edema. Lymphadenopathy:  
   Head:  
   Right side of head: No submandibular or tonsillar adenopathy. Left side of head: No submandibular or tonsillar adenopathy. Cervical: No cervical adenopathy. Upper Body:  
   Right upper body: No supraclavicular adenopathy. Left upper body: No supraclavicular adenopathy. Skin: 
   General: Skin is warm and dry. Neurological:  
   Mental Status: He is alert and oriented to person, place, and time. Psychiatric:     
   Speech: Speech normal.  
 
 
 
ASSESSMENT and PLAN Diagnoses and all orders for this visit: 1. Cough 2. SOB (shortness of breath) 3. Malaise 4. History of 2019 novel coronavirus disease (COVID-19) 
-     XR CHEST PA LAT; Future 
-     fluticasone furoate (ARNUITY ELLIPTA) 200 mcg/actuation dsdv inhaler; Take 1 Puff by inhalation daily. 
 - Usage discussed. Rinse/spit after use. 5. Essential hypertension 
-     CBC WITH AUTOMATED DIFF; Future -     METABOLIC PANEL, COMPREHENSIVE; Future -     LIPID PANEL; Future -     MICROALBUMIN, UR, RAND W/ MICROALB/CREAT RATIO; Future 
-     TSH 3RD GENERATION; Future - Cont Losartan HCT. 6. Prediabetes 
-     TSH 3RD GENERATION; Future 
-     HEMOGLOBIN A1C W/O EAG; Future 7. Prostate cancer screening -     PSA SCREENING (SCREENING); Future Other orders 
-     fluticasone propionate (FLONASE) 50 mcg/actuation nasal spray; 2 Sprays by Both Nostrils route daily. - Refill. Follow-up and Dispositions · Return in about 1 month (around 4/12/2021) for f/u chronic COVID symptoms.

## 2021-03-12 NOTE — PROGRESS NOTES
Robert Graham is a 63 y.o. male (: 1960) presenting to address: Chief Complaint Patient presents with  New Patient [facility]  Hypertension Vitals:  
 21 6673 BP: 109/75 Pulse: 92 Resp: 18 Temp: 97.4 °F (36.3 °C) TempSrc: Oral  
SpO2: 98% Weight: 210 lb (95.3 kg) Height: 5' 10\" (1.778 m) PainSc:   0 - No pain Hearing/Vision: No exam data present Learning Assessment:  
 
Learning Assessment 3/4/2016 PRIMARY LEARNER Patient HIGHEST LEVEL OF EDUCATION - PRIMARY LEARNER  GRADUATED HIGH SCHOOL OR GED  
BARRIERS PRIMARY LEARNER NONE  
CO-LEARNER CAREGIVER No  
PRIMARY LANGUAGE ENGLISH  NEED No  
LEARNER PREFERENCE PRIMARY DEMONSTRATION  
LEARNING SPECIAL TOPICS no  
ANSWERED BY patient RELATIONSHIP SELF  
ASSESSMENT COMMENT none Depression Screening:  
 
3 most recent PHQ Screens 3/12/2021 Little interest or pleasure in doing things Not at all Feeling down, depressed, irritable, or hopeless Not at all Total Score PHQ 2 0 Fall Risk Assessment:  
 
Fall Risk Assessment, last 12 mths 3/12/2021 Able to walk? Yes Fall in past 12 months? 0 Abuse Screening: No flowsheet data found. Coordination of Care Questionaire: 1. Have you been to the ER, urgent care clinic since your last visit? Hospitalized since your last visit? NO 
 
2. Have you seen or consulted any other health care providers outside of the [address] [place] [address] since your last visit? Include any pap smears or colon screening. YES Advanced Directive: 1. Do you have an Advanced Directive? YES 
 
2. Would you like information on Advanced Directives?  NO

## 2021-03-22 NOTE — TELEPHONE ENCOUNTER
Patient called - is in hospital.  Called Wednesday to find out what he should do, and ended up in the ER. Please call him back.

## 2021-03-22 NOTE — TELEPHONE ENCOUNTER
I have reviewed his admission chart. It looks like they are wanting an infectious disease specialist to see him which is a good idea. I recommend he complete his hospital work-up and follow-up in the office after discharge. Please advise patient.

## 2021-03-24 NOTE — TELEPHONE ENCOUNTER
Spoke with patient wife and informed and informed her of Vidhi message. Patient verbally understood and had no other questions.

## 2021-04-01 NOTE — PROGRESS NOTES
Spouse brings STD paperwork in to be completed at the office. He remains hospitalized for complications thought to be related to previous COVID infection. Form completed, faxed, and sent to scanning.

## 2023-02-09 NOTE — PROGRESS NOTES
Rm: 18 
 
Chief Complaint Patient presents with  Hypertension Depression Screening: 
3 most recent [facility] [address] Screens 7/16/2019 1/10/2019 7/10/2018 7/5/2017 4/3/2017 12/16/2016 7/2/2015 Little interest or pleasure in doing things Not at all Not at all Not at all Not at all Not at all Not at all Not at all Feeling down, depressed, irritable, or hopeless Not at all Not at all Not at all Not at all Not at all Not at all Not at all Total Score PHQ 2 0 0 0 0 0 0 0 Learning Assessment: 
Learning Assessment 3/4/2016 6/25/2014 PRIMARY LEARNER Patient Patient HIGHEST LEVEL OF EDUCATION - PRIMARY LEARNER  GRADUATED HIGH SCHOOL OR GED -  
BARRIERS PRIMARY LEARNER NONE -  
CO-LEARNER CAREGIVER No - PRIMARY LANGUAGE ENGLISH ENGLISH  NEED No -  
LEARNER PREFERENCE PRIMARY DEMONSTRATION LISTENING  
LEARNING SPECIAL TOPICS no -  
ANSWERED BY patient patient RELATIONSHIP SELF SELF  
ASSESSMENT COMMENT none -  
 
 
Abuse Screening: No flowsheet data found. Health Maintenance reviewed and discussed per provider: yes Coordination of Care: 1. Have you been to the ER, urgent care clinic since your last visit? Hospitalized since your last visit? no 
 
2. Have you seen or consulted any other health care providers outside of the [address] [place] [address] since your last visit? Include any pap smears or colon screening.  no 
 
 
 
 Biopsy Type: H and E